# Patient Record
Sex: MALE | Race: WHITE | Employment: FULL TIME | URBAN - METROPOLITAN AREA
[De-identification: names, ages, dates, MRNs, and addresses within clinical notes are randomized per-mention and may not be internally consistent; named-entity substitution may affect disease eponyms.]

---

## 2017-05-10 ENCOUNTER — TRANSFERRED RECORDS (OUTPATIENT)
Dept: HEALTH INFORMATION MANAGEMENT | Facility: CLINIC | Age: 57
End: 2017-05-10

## 2017-09-06 ENCOUNTER — OFFICE VISIT (OUTPATIENT)
Dept: PEDIATRICS | Facility: CLINIC | Age: 57
End: 2017-09-06
Payer: COMMERCIAL

## 2017-09-06 VITALS
OXYGEN SATURATION: 96 % | HEART RATE: 90 BPM | DIASTOLIC BLOOD PRESSURE: 85 MMHG | TEMPERATURE: 99.1 F | WEIGHT: 192.1 LBS | BODY MASS INDEX: 34.03 KG/M2 | SYSTOLIC BLOOD PRESSURE: 147 MMHG

## 2017-09-06 DIAGNOSIS — I10 ESSENTIAL HYPERTENSION, BENIGN: ICD-10-CM

## 2017-09-06 DIAGNOSIS — J06.9 VIRAL UPPER RESPIRATORY TRACT INFECTION: Primary | ICD-10-CM

## 2017-09-06 DIAGNOSIS — R06.2 WHEEZING: ICD-10-CM

## 2017-09-06 PROCEDURE — 99213 OFFICE O/P EST LOW 20 MIN: CPT | Mod: GE | Performed by: INTERNAL MEDICINE

## 2017-09-06 RX ORDER — ALBUTEROL SULFATE 90 UG/1
2 AEROSOL, METERED RESPIRATORY (INHALATION) EVERY 4 HOURS PRN
Qty: 1 INHALER | Refills: 1 | Status: SHIPPED | OUTPATIENT
Start: 2017-09-06 | End: 2019-12-13

## 2017-09-06 RX ORDER — FLUTICASONE PROPIONATE 50 MCG
1-2 SPRAY, SUSPENSION (ML) NASAL DAILY
Qty: 3 BOTTLE | Refills: 1 | Status: SHIPPED | OUTPATIENT
Start: 2017-09-06 | End: 2019-12-13

## 2017-09-06 NOTE — NURSING NOTE
"Chief Complaint   Patient presents with     Cough       Initial Wt 192 lb 1.6 oz (87.1 kg)  BMI 34.03 kg/m2 Estimated body mass index is 34.03 kg/(m^2) as calculated from the following:    Height as of 4/22/14: 5' 3\" (1.6 m).    Weight as of this encounter: 192 lb 1.6 oz (87.1 kg).  Medication Reconciliation: complete   SMA Shraddha  "

## 2017-09-06 NOTE — PROGRESS NOTES
"  SUBJECTIVE:   Jeffrey Skaggs is a 57 year old male who presents to clinic today for the following health issues:      RESPIRATORY SYMPTOMS      Duration: 10 days    Description  cough and wheezing    Severity: mild    Accompanying signs and symptoms: Hacking cough with mucus, \"phlegmy cough\"    History (predisposing factors):  none    Precipitating or alleviating factors: None    Therapies tried and outcome:  OTC cough medicine    URI: Patient with 10 days of symptoms starting with rhinorrhea, congestion, & drainage. This quickly resolved, but clear productive cough has lingered. Now with wheezing & continued nagging cough. No fevers, chills. No SOB or chest pain.    HTN: Hx of Benign essential HTN. No longer on medications. 160s systolic on presentation today, 140s on recheck. No HAs or flushing.     Problem list and histories reviewed & adjusted, as indicated.  Additional history: as documented    Patient Active Problem List   Diagnosis     Essential hypertension, benign     Abnormal liver function tests     Type 2 diabetes mellitus with stage 2 chronic kidney disease (H)     CKD (chronic kidney disease) stage 3, GFR 30-59 ml/min     Cirrhosis of liver (H)     Hyperlipidemia LDL goal <100     History reviewed. No pertinent surgical history.    Social History   Substance Use Topics     Smoking status: Never Smoker     Smokeless tobacco: Never Used     Alcohol use Yes      Comment: stopped2-3 weeks ago     Family History   Problem Relation Age of Onset     C.A.D. Father      first MI at age 73     DIABETES Mother      C.A.D. Paternal Grandfather      first MI in his 30s, from which he          Reviewed and updated as needed this visit by clinical staff     Reviewed and updated as needed this visit by Provider       ROS:  Constitutional, HEENT, cardiovascular, pulmonary, gi and gu systems are negative, except as otherwise noted.    OBJECTIVE:   /85  Pulse 90  Temp 99.1  F (37.3  C) (Oral)  Wt 192 lb 1.6 " oz (87.1 kg)  SpO2 96%  BMI 34.03 kg/m2  Body mass index is 34.03 kg/(m^2).  GENERAL: healthy, alert and no distress  EYES: Eyes grossly normal to inspection, PERRL and conjunctivae and sclerae normal  HENT: ear canals with cerumen bilaterally, nose and mouth without ulcers or lesions  NECK: no adenopathy, no asymmetry  RESP: Wheezing at bilateral bases. Good air movement, good oxygenation  CV: regular rate and rhythm, normal S1 S2, no murmurs and peripheral pulses strong  SKIN: no suspicious lesions or rashes  NEURO: Normal strength and tone, mentation intact and speech normal    Diagnostic Test Results:  none     ASSESSMENT/PLAN:   Jeffrey Skaggs is a 57 year old with complex medical history: HTN, DMII, CKD, Liver Cirrhosis, HLD, who presents with URI symptoms & reactive cough/wheezing. Patient HTNsive on arrival to 160s with improvement to 140s later in the visit. Will get nursing recheck in 2 weeks & establish care with this clinic.     1. Viral upper respiratory tract infection  - Supportive cares: Hydration, rest  - Can continue OTC cold remedies  - fluticasone (FLONASE) 50 MCG/ACT spray; Spray 1-2 sprays into both nostrils daily  Dispense: 3 Bottle; Refill: 1  - Albuterol as per below    2. Wheezing  - albuterol (PROAIR HFA/PROVENTIL HFA/VENTOLIN HFA) 108 (90 BASE) MCG/ACT Inhaler; Inhale 2 puffs into the lungs every 4 hours as needed for shortness of breath / dyspnea or wheezing  Dispense: 1 Inhaler; Refill: 1    3. Essential hypertension, benign: Patient with hx of BEH. 160s ->140s today.   - 2 week BP recheck    Follow up in 2 weeks of HTN. Follow up within 1 month to establish complete care at [facility].     Adal Palma MD  [facility]    I have discussed the patient with the resident and agree with the jointly developed plan as documented above    Marleny Abdalla MD  Internal Medicine - Pediatrics

## 2017-09-06 NOTE — PATIENT INSTRUCTIONS
Cough/Cold  - Consider Netti pot of congestion returns  - Flonase, two puffs each nostril twice daily for 2 weeks, daily after that as needed  - Albuterol 2 puffs every 4 hours for the next 4-5 days, then as needed    High Blood Pressure  - Follow up in 2 weeks for nursing only recheck    Establish Care  - Please schedule a physical in the next month to discuss your medical issues

## 2017-09-06 NOTE — MR AVS SNAPSHOT
After Visit Summary   9/6/2017    Jeffrey Skaggs    MRN: [id number]           Patient Information     Date Of Birth          1960        Visit Information        Provider Department      9/6/2017 1:00 PM Adal Palma MD [facility]        Today's Diagnoses     Viral upper respiratory tract infection    -  1    Wheezing        Essential hypertension, benign          Care Instructions    Cough/Cold  - Consider Netti pot of congestion returns  - Flonase, two puffs each nostril twice daily for 2 weeks, daily after that as needed  - Albuterol 2 puffs every 4 hours for the next 4-5 days, then as needed    High Blood Pressure  - Follow up in 2 weeks for nursing only recheck    Establish Care  - Please schedule a physical in the next month to discuss your medical issues          Follow-ups after your visit        Who to contact     If you have questions or need follow up information about today's clinic visit or your schedule please contact [facility] directly at [phone].  Normal or non-critical lab and imaging results will be communicated to you by [email or web address], letter or phone within 4 business days after the clinic has received the results. If you do not hear from us within 7 days, please contact the clinic through [employer] or phone. If you have a critical or abnormal lab result, we will notify you by phone as soon as possible.  Submit refill requests through [employer] or call your pharmacy and they will forward the refill request to us. Please allow 3 business days for your refill to be completed.          Additional Information About Your Visit        [email or web address] Information     [employer] gives you secure access to your electronic health record. If you see a primary care provider, you can also send messages to your care team and make appointments. If you have questions, please call your primary care clinic.  If you do not have a primary care provider, please call [phone] and  they will assist you.        Care EveryWhere ID     This is your Care EveryWhere ID. This could be used by other organizations to access your [facility] medical records  [id number]        Your Vitals Were     Pulse Temperature Pulse Oximetry BMI (Body Mass Index)          90 99.1  F (37.3  C) (Oral) 96% 34.03 kg/m2         Blood Pressure from Last 3 Encounters:   09/06/17 147/85   04/22/14 110/68   05/22/13 127/69    Weight from Last 3 Encounters:   09/06/17 192 lb 1.6 oz (87.1 kg)   04/22/14 160 lb (72.6 kg)   05/10/13 160 lb (72.6 kg)              Today, you had the following     No orders found for display         Today's Medication Changes          These changes are accurate as of: 9/6/17  1:36 PM.  If you have any questions, ask your nurse or doctor.               Start taking these medicines.        Dose/Directions    albuterol 108 (90 BASE) MCG/ACT Inhaler   Commonly known as:  PROAIR HFA/PROVENTIL HFA/VENTOLIN HFA   Used for:  Wheezing   Started by:  Adal Palma MD        Dose:  2 puff   Inhale 2 puffs into the lungs every 4 hours as needed for shortness of breath / dyspnea or wheezing   Quantity:  1 Inhaler   Refills:  1       fluticasone 50 MCG/ACT spray   Commonly known as:  FLONASE   Used for:  Viral upper respiratory tract infection   Started by:  Adal Palma MD        Dose:  1-2 spray   Spray 1-2 sprays into both nostrils daily   Quantity:  3 Bottle   Refills:  1            Where to get your medicines      These medications were sent to [facility] [address] [place] [address] - [address] AT [place] [address] & [employer]  [address], [facility] [address]     Phone:  [phone]     albuterol 108 (90 BASE) MCG/ACT Inhaler    fluticasone 50 MCG/ACT spray                Primary Care Provider Office Phone # Fax #    Young Owens [phone]       [address] [facility] [address]        Equal Access to Services     RICHIE COLLAZO AH: Kim merchant  andrea Castellanos, wabarbarada luqadaha, qaybta kaalmada beatriz, kaitlin perkins. So [facility] [phone].    ATENCIÓN: Si malenala gino, tiene a whitten disposición servicios gratuitos de asistencia lingüística. Felecia al [phone].    We comply with applicable federal civil rights laws and [place] laws. We do not discriminate on the basis of race, color, national origin, age, disability sex, sexual orientation or gender identity.            Thank you!     Thank you for choosing [facility]  for your care. Our goal is always to provide you with excellent care. Hearing back from our patients is one way we can continue to improve our services. Please take a few minutes to complete the written survey that you may receive in the mail after your visit with us. Thank you!             Your Updated Medication List - Protect others around you: Learn how to safely use, store and throw away your medicines at www.disposemymeds.org.          This list is accurate as of: 9/6/17  1:36 PM.  Always use your most recent med list.                   Brand Name Dispense Instructions for use Diagnosis    * ACE/ARB NOT PRESCRIBED (INTENTIONAL)      1 each continuous prn ACE & ARB not prescribed due to Worsening renal function on ACE/ARB therapy    Type 2 diabetes, HbA1C goal < 8% (H), CKD (chronic kidney disease) stage 3, GFR 30-59 ml/min       albuterol 108 (90 BASE) MCG/ACT Inhaler    PROAIR HFA/PROVENTIL HFA/VENTOLIN HFA    1 Inhaler    Inhale 2 puffs into the lungs every 4 hours as needed for shortness of breath / dyspnea or wheezing    Wheezing       ALDACTONE 25 MG tablet   Generic drug:  spironolactone      Take 1 tablet by mouth daily.        aspirin 81 MG tablet     30 tablet    Take 1 tablet (81 mg) by mouth daily    Type 2 diabetes, HbA1C goal < 8% (H)       blood glucose monitoring lancets     100 each    1 Device 2 times daily    Type 2 diabetes, HbA1C goal < 8% (H)       blood glucose  monitoring test strip    ACCU-CHEK SMARTVIEW    1 Box    Test 2 times a day    Type 2 diabetes, HbA1C goal < 8% (H)       calcium-vitamin D 600-400 MG-UNIT per tablet    CALTRATE     Take 1 tablet by mouth 2 times daily.        fluticasone 50 MCG/ACT spray    FLONASE    3 Bottle    Spray 1-2 sprays into both nostrils daily    Viral upper respiratory tract infection       furosemide 20 MG tablet    LASIX     Take 20 mg by mouth daily.        * STATIN NOT PRESCRIBED (INTENTIONAL)     0 each    1 each continuous prn Statin not prescribed intentionally due to Clinically Significant elevated serum transaminases (AST/ALT) or creatine kinase    Type 2 diabetes, HbA1C goal < 8% (H), CARDIOVASCULAR SCREENING; LDL GOAL LESS THAN 100       * Notice:  This list has 2 medication(s) that are the same as other medications prescribed for you. Read the directions carefully, and ask your doctor or other care provider to review them with you.

## 2018-05-24 ENCOUNTER — TELEPHONE (OUTPATIENT)
Dept: PEDIATRICS | Facility: CLINIC | Age: 58
End: 2018-05-24

## 2018-05-24 ENCOUNTER — OFFICE VISIT (OUTPATIENT)
Dept: PEDIATRICS | Facility: CLINIC | Age: 58
End: 2018-05-24
Payer: COMMERCIAL

## 2018-05-24 VITALS
SYSTOLIC BLOOD PRESSURE: 142 MMHG | OXYGEN SATURATION: 95 % | HEART RATE: 89 BPM | TEMPERATURE: 97.5 F | WEIGHT: 189 LBS | BODY MASS INDEX: 33.48 KG/M2 | DIASTOLIC BLOOD PRESSURE: 76 MMHG

## 2018-05-24 DIAGNOSIS — R10.31 ABDOMINAL PAIN, BILATERAL LOWER QUADRANT: Primary | ICD-10-CM

## 2018-05-24 DIAGNOSIS — R31.9 URINARY TRACT INFECTION WITH HEMATURIA, SITE UNSPECIFIED: ICD-10-CM

## 2018-05-24 DIAGNOSIS — R10.32 ABDOMINAL PAIN, BILATERAL LOWER QUADRANT: Primary | ICD-10-CM

## 2018-05-24 DIAGNOSIS — N39.0 URINARY TRACT INFECTION WITH HEMATURIA, SITE UNSPECIFIED: ICD-10-CM

## 2018-05-24 LAB
ALBUMIN UR-MCNC: >=300 MG/DL
ANION GAP SERPL CALCULATED.3IONS-SCNC: 10 MMOL/L (ref 3–14)
APPEARANCE UR: ABNORMAL
BACTERIA #/AREA URNS HPF: ABNORMAL /HPF
BILIRUB UR QL STRIP: ABNORMAL
BUN SERPL-MCNC: 12 MG/DL (ref 7–30)
CALCIUM SERPL-MCNC: 8.7 MG/DL (ref 8.5–10.1)
CHLORIDE SERPL-SCNC: 97 MMOL/L (ref 94–109)
CO2 SERPL-SCNC: 26 MMOL/L (ref 20–32)
COLOR UR AUTO: ABNORMAL
CREAT SERPL-MCNC: 1.4 MG/DL (ref 0.66–1.25)
GFR SERPL CREATININE-BSD FRML MDRD: 52 ML/MIN/1.7M2
GLUCOSE SERPL-MCNC: 162 MG/DL (ref 70–99)
GLUCOSE UR STRIP-MCNC: 100 MG/DL
HGB UR QL STRIP: ABNORMAL
KETONES UR STRIP-MCNC: ABNORMAL MG/DL
LEUKOCYTE ESTERASE UR QL STRIP: ABNORMAL
NITRATE UR QL: NEGATIVE
NON-SQ EPI CELLS #/AREA URNS LPF: ABNORMAL /LPF
PH UR STRIP: 5.5 PH (ref 5–7)
POTASSIUM SERPL-SCNC: 4.1 MMOL/L (ref 3.4–5.3)
RBC #/AREA URNS AUTO: ABNORMAL /HPF
SODIUM SERPL-SCNC: 133 MMOL/L (ref 133–144)
SOURCE: ABNORMAL
SP GR UR STRIP: 1.02 (ref 1–1.03)
URATE CRY #/AREA URNS HPF: ABNORMAL /HPF
UROBILINOGEN UR STRIP-ACNC: 0.2 EU/DL (ref 0.2–1)
WBC # BLD AUTO: 9.9 10E9/L (ref 4–11)
WBC #/AREA URNS AUTO: ABNORMAL /HPF

## 2018-05-24 PROCEDURE — 85048 AUTOMATED LEUKOCYTE COUNT: CPT | Performed by: PHYSICIAN ASSISTANT

## 2018-05-24 PROCEDURE — 36415 COLL VENOUS BLD VENIPUNCTURE: CPT | Performed by: PHYSICIAN ASSISTANT

## 2018-05-24 PROCEDURE — 80048 BASIC METABOLIC PNL TOTAL CA: CPT | Performed by: PHYSICIAN ASSISTANT

## 2018-05-24 PROCEDURE — 81001 URINALYSIS AUTO W/SCOPE: CPT | Performed by: PHYSICIAN ASSISTANT

## 2018-05-24 PROCEDURE — 99213 OFFICE O/P EST LOW 20 MIN: CPT | Performed by: PHYSICIAN ASSISTANT

## 2018-05-24 RX ORDER — CIPROFLOXACIN 500 MG/1
500 TABLET, FILM COATED ORAL 2 TIMES DAILY
Qty: 20 TABLET | Refills: 0 | Status: SHIPPED | OUTPATIENT
Start: 2018-05-24 | End: 2019-12-13

## 2018-05-24 RX ORDER — AMOXICILLIN 500 MG/1
CAPSULE ORAL
COMMUNITY
Start: 2018-05-09 | End: 2019-12-13

## 2018-05-24 RX ORDER — OMEGA-3 FATTY ACIDS/FISH OIL 300-1000MG
CAPSULE ORAL
COMMUNITY
End: 2019-12-13

## 2018-05-24 NOTE — PATIENT INSTRUCTIONS
"Follow up if worsening  Follow up if not improved in 2-3 days         BLADDER INFECTION: Male (Adult)    A bladder infection (\"cystitis\" or \"UTI\") usually causes a constant urge to urinate, and a burning when passing urine. Urine may be cloudy, smelly or dark. There may be also be pain in the lower abdomen.  Cystitis in males is not common. It may be caused by a partial blockage in the urinary system that keeps the bladder from emptying completely. This is most often related to an enlarged prostate gland.  HOME CARE:  1. Drink lots of fluids (at least 6-8 glasses a day). This will flush the bacteria out of your bladder. Cranberry juice has been shown to help clear out the bacteria.  2. Avoid sexual intercourse until your symptoms are gone.  3. A bladder infection is treated with antibiotics. You may also be given Pyridium (generic - phenazopyridine) to reduce burning with urination. This will cause urine to become a bright orange color, which can stain clothing.  FOLLOW UP with your doctor or this facility if ALL symptoms have not cleared within five days. It is important to keep your follow up appointment to discuss with your doctor the need for further tests of the urinary tract.  GET PROMPT MEDICAL ATTENTION if any of the following occur:    Fever over 101  F (38.3  C)    No improvement by the third day of treatment    Increasing back or abdominal pain    Repeated vomiting; unable to keep medicine down    Weakness, dizziness or fainting    8728-1712 [employer]. [address] [place] [address], [place] [address]. All rights reserved. This information is not intended as a substitute for professional medical care. Always follow your healthcare professional's instructions.  This information has been modified by your health care provider with permission from the publisher.    Pyelonephritis or Kidney Infection (Adult Male)  An infection of one or both kidneys is called pyelonephritis. It usually happens when " bacteria (or rarely, viruses, fungi, or other disease-causing organisms) get into the kidneys. The bacteria (or other disease-causing organisms) can enter the kidneys from the bladder or blood traveling from other parts of the body to cause pyelonephritis. A kidney infection can become serious. It can cause severe illness, scarring of the kidneys, or kidney failure if not treated properly.   Common causes include:    Not keeping the genital area clean and dry, which promotes the growth of bacteria    Wearing tight pants or underwear, which allows moisture to build up in the genital area, helping bacteria grow    Holding urine for long periods of time    Dehydration  Kidney infections can cause symptoms similar to bladder infections. The infection can cause one or more of these symptoms:    Pain or burning when urinating    Having to urinate more often than usual    Blood in urine (pink or red)    Belly pain or discomfort, usually in the lower abdomen    Pain in the side or back    Pain above the pubic bone    Fever or chills    Vomiting    Loss of appetite  Treatment is oral antibiotics, or in more severe cases, intramuscular or intravenous (IV) antibiotics. These are started right away. Treatment helps prevent a more serious kidney infection.  Medicines  Medicines can help in the treatment of kidney infection:    Take antibiotics until they are used up, even if you feel better. It is important to finish them to make sure the infection is gone.    Unless another medicine was given, you can use over-the-counter medicines for pain, fever, or discomfort. If you have chronic liver or kidney disease, talk with your healthcare provider before taking these medicines. Also tell your provider if you've ever had a stomach ulcer of gastrointestinal (GI) bleeding, or are taking blood thinners.  Home care  The following guidelines can help you care for yourself at home:    Stay home from work or school. Rest in bed until your  fever breaks and you are feeling better, or as advised by your healthcare provider.    Drink lots of fluid unless you must restrict fluids for other medical reasons. This will force the medicine into your urinary system and help flush the bacteria out of your body. Ask your healthcare provider how much you should drink.    Avoid sex until you have finished all of your medicine and your symptoms are gone.    Avoid caffeine, alcohol, and spicy foods. These foods may irritate the kidneys and bladder.    Wear loose-fitting clothes and cotton underwear.  Prevention  These self-care steps can help prevent future infections:    Drink plenty of fluids to prevent dehydration and flush out the bladder. Do this unless you must restrict your fluids for other health reasons, or your healthcare provider told you not to.    Proper cleaning after going to the bathroom is important.    Clean your penis regularly. If you aren't circumcised, pull back the foreskin when cleaning.    Urinate more often. Don't try to hold urine in for a long time.    Avoid tight-fitting pants and underwear.    Improve your diet and prevent constipation. Eat more fresh fruit, vegetables, and fiber. Eat less junk and fatty foods. Constipation can increase your chance of getting a urinary tract infection. Talk with your healthcare provider if you have trouble with bowel movements.    Urinate right after sex to flush out the bladder.  Follow-up care  Follow up with your healthcare provider, or as advised. Additional testing may be needed to make sure the infection is clearing. Close follow-up and further testing is very important to find the cause and to prevent future infections.  If a urine culture was done, you will be contacted if your treatment needs to be changed. If directed, you can call to find out the results.  If you had an X-ray, CT scan, or another diagnostic test, you will be notified of any new findings that may affect your care.  Call  911  Call 911 if any of the following occur:    Trouble breathing    Fainting or loss of consciousness    Rapid or very slow heart rate    Weakness, dizziness, or fainting    Difficulty arousing or confusion  When to seek medical advice  Call your healthcare provider right away if any of the following occur:    Fever of 100.4  F (38  C) or higher, or as directed by your healthcare provider    Not feeling better within 1 to 2 days after starting antibiotics    Any symptom that continues after 3 days of treatment    Increasing pain in the stomach, back, side, or groin area    Repeated vomiting    Not able to take prescribed medicine due to nausea or another reason    Bloody, dark-colored, or foul smelling urine    Trouble urinating or decreased urine output    No urine for 8 hours, no tears when crying, sunken eyes, or dry mouth  Date Last Reviewed: 10/1/2016    4444-9173 [employer]. [address] [place] [address]. All rights reserved. This information is not intended as a substitute for professional medical care. Always follow your healthcare professional's instructions.

## 2018-05-24 NOTE — TELEPHONE ENCOUNTER
Patient suspecting food poisoning.    Sx presented quickly. Started Tuesday late evening and still present this morning. Reports generalized abdominal pain just below the waits and under rib cage. Dull pain traveled to groin. Dull ache is constant. Is able to stand up straight and find a comfortable positioning. Denies fever, diarrhea, or vomiting. 1 episode of dry heeves and mild nausea.     Has tried bland diet, chicken soup, water, and ginger ale.     No other persons in home have sx. He reports preparing raw hamburger meat while on grill and not sure of possible cross contamination.     Reports hx of similar episode a while back. No hx of diverticulitis or kidney stones. Hx of cirrhosis and ascites but not active.     OV scheduled per patient request.     Bethany DOMINGO RN, BSN, PHN  Iron Gunter RN

## 2018-05-24 NOTE — PROGRESS NOTES
SUBJECTIVE:   Jeffrey Skaggs is a 57 year old male who presents to clinic today for the following health issues:      ABDOMINAL PAIN     Onset: 2 days ago- Tuesday evening     Description:   Character: Dull ache and constant  Location: under the rib cage to the waist- bot sides   Radiation: groin    Intensity: 9/10 Tuesday, now 2-3/10    Progression of Symptoms:  improving    Accompanying Signs & Symptoms:  Fever/Chills?: no   Gas/Bloating: no   Nausea: YES- mild  Vomitting: no-dry heaves  Diarrhea?: no   Constipation:no.  Passed stool 1 hour ago  Dysuria or Hematuria: no    History:   Trauma: no   Previous similar pain: YES- I previous episode a while back, hx  Of cirrhosis and ascites but not active    Previous tests done: none    Precipitating factors:   Does the pain change with:     Food: no      BM: no     Urination: no     Alleviating factors:  Standing and shower    Therapies Tried and outcome: [place] diet, chicken soup, water, and ginger ale, Miralax     LMP:  not applicable     No muscle, bladder changes   Pre-diabetic, no therapy    Amoxicillin 500 mg BID for 1 week    Primary: Yasmeen    ROS:  10 point ROS negative except as listed above      OBJECTIVE:     /76  Pulse 89  Temp 97.5  F (36.4  C) (Oral)  Wt 189 lb (85.7 kg)  SpO2 95%  BMI 33.48 kg/m2  Body mass index is 33.48 kg/(m^2).  GENERAL: healthy, alert and no distress  NECK: no adenopathy, no asymmetry, masses, or scars and thyroid normal to palpation  RESP: lungs clear to auscultation - no rales, rhonchi or wheezes  CV: regular rate and rhythm, normal S1 S2, no S3 or S4, no murmur, click or rub, no peripheral edema and peripheral pulses strong  ABDOMEN: soft, nontender, no hepatosplenomegaly, no masses and bowel sounds normal  MS: no gross musculoskeletal defects noted, no edema  SKIN: no suspicious lesions or rashes  NEURO: Normal strength and tone, mentation intact and speech normal  BACK: no CVA tenderness, no paralumbar  tenderness  PSYCH: mentation appears normal, affect normal/bright    Diagnostic Test Results:  Results for orders placed or performed in visit on 05/24/18   UA reflex to Microscopic   Result Value Ref Range    Color Urine Elizabeth     Appearance Urine Slightly Cloudy     Glucose Urine 100 (A) NEG^Negative mg/dL    Bilirubin Urine Small (A) NEG^Negative    Ketones Urine Trace (A) NEG^Negative mg/dL    Specific Gravity Urine 1.020 1.003 - 1.035    Blood Urine Large (A) NEG^Negative    pH Urine 5.5 5.0 - 7.0 pH    Protein Albumin Urine >=300 (A) NEG^Negative mg/dL    Urobilinogen Urine 0.2 0.2 - 1.0 EU/dL    Nitrite Urine Negative NEG^Negative    Leukocyte Esterase Urine Trace (A) NEG^Negative    Source Midstream Urine    WBC count   Result Value Ref Range    WBC 9.9 4.0 - 11.0 10e9/L   Basic metabolic panel  (Ca, Cl, CO2, Creat, Gluc, K, Na, BUN)   Result Value Ref Range    Sodium 133 133 - 144 mmol/L    Potassium 4.1 3.4 - 5.3 mmol/L    Chloride 97 94 - 109 mmol/L    Carbon Dioxide 26 20 - 32 mmol/L    Anion Gap 10 3 - 14 mmol/L    Glucose 162 (H) 70 - 99 mg/dL    Urea Nitrogen 12 7 - 30 mg/dL    Creatinine 1.40 (H) 0.66 - 1.25 mg/dL    GFR Estimate 52 (L) >60 mL/min/1.7m2    GFR Estimate If Black 63 >60 mL/min/1.7m2    Calcium 8.7 8.5 - 10.1 mg/dL   Urine Microscopic   Result Value Ref Range    WBC Urine 5-10 (A) OTO5^0 - 5 /HPF    RBC Urine  (A) OTO2^O - 2 /HPF    Squamous Epithelial /LPF Urine Moderate (A) FEW^Few /LPF    Bacteria Urine Moderate (A) NEG^Negative /HPF    Uric Acid Crystals Many (A) NEG^Negative /HPF       ASSESSMENT/PLAN:     (R10.31,  R10.32) Abdominal pain, bilateral lower quadrant  (primary encounter diagnosis)  Plan: UA reflex to Microscopic, WBC count, Basic         metabolic panel  (Ca, Cl, CO2, Creat, Gluc, K,         Na, BUN), Urine Microscopic  Red flags and emergent follow up discussed, and understood by patient  Follow up with PCP if symptoms worsen or fail to improve      (N39.0,   "R31.9) Urinary tract infection with hematuria, site unspecified  Plan: ciprofloxacin (CIPRO) 500 MG tablet  Follow up with PCP if symptoms worsen or fail to improve  In 2-3 days      Patient Instructions   Follow up if worsening  Follow up if not improved in 2-3 days         BLADDER INFECTION: Male (Adult)    A bladder infection (\"cystitis\" or \"UTI\") usually causes a constant urge to urinate, and a burning when passing urine. Urine may be cloudy, smelly or dark. There may be also be pain in the lower abdomen.  Cystitis in males is not common. It may be caused by a partial blockage in the urinary system that keeps the bladder from emptying completely. This is most often related to an enlarged prostate gland.  HOME CARE:  1. Drink lots of fluids (at least 6-8 glasses a day). This will flush the bacteria out of your bladder. Cranberry juice has been shown to help clear out the bacteria.  2. Avoid sexual intercourse until your symptoms are gone.  3. A bladder infection is treated with antibiotics. You may also be given Pyridium (generic - phenazopyridine) to reduce burning with urination. This will cause urine to become a bright orange color, which can stain clothing.  FOLLOW UP with your doctor or this facility if ALL symptoms have not cleared within five days. It is important to keep your follow up appointment to discuss with your doctor the need for further tests of the urinary tract.  GET PROMPT MEDICAL ATTENTION if any of the following occur:    Fever over 101  F (38.3  C)    No improvement by the third day of treatment    Increasing back or abdominal pain    Repeated vomiting; unable to keep medicine down    Weakness, dizziness or fainting    [id number] [employer]. [address] [place] [address], [place] [address]. All rights reserved. This information is not intended as a substitute for professional medical care. Always follow your healthcare professional's instructions.  This information has been modified " by your health care provider with permission from the publisher.    Pyelonephritis or Kidney Infection (Adult Male)  An infection of one or both kidneys is called pyelonephritis. It usually happens when bacteria (or rarely, viruses, fungi, or other disease-causing organisms) get into the kidneys. The bacteria (or other disease-causing organisms) can enter the kidneys from the bladder or blood traveling from other parts of the body to cause pyelonephritis. A kidney infection can become serious. It can cause severe illness, scarring of the kidneys, or kidney failure if not treated properly.   Common causes include:    Not keeping the genital area clean and dry, which promotes the growth of bacteria    Wearing tight pants or underwear, which allows moisture to build up in the genital area, helping bacteria grow    Holding urine for long periods of time    Dehydration  Kidney infections can cause symptoms similar to bladder infections. The infection can cause one or more of these symptoms:    Pain or burning when urinating    Having to urinate more often than usual    Blood in urine (pink or red)    Belly pain or discomfort, usually in the lower abdomen    Pain in the side or back    Pain above the pubic bone    Fever or chills    Vomiting    Loss of appetite  Treatment is oral antibiotics, or in more severe cases, intramuscular or intravenous (IV) antibiotics. These are started right away. Treatment helps prevent a more serious kidney infection.  Medicines  Medicines can help in the treatment of kidney infection:    Take antibiotics until they are used up, even if you feel better. It is important to finish them to make sure the infection is gone.    Unless another medicine was given, you can use over-the-counter medicines for pain, fever, or discomfort. If you have chronic liver or kidney disease, talk with your healthcare provider before taking these medicines. Also tell your provider if you've ever had a stomach  ulcer of gastrointestinal (GI) bleeding, or are taking blood thinners.  Home care  The following guidelines can help you care for yourself at home:    Stay home from work or school. Rest in bed until your fever breaks and you are feeling better, or as advised by your healthcare provider.    Drink lots of fluid unless you must restrict fluids for other medical reasons. This will force the medicine into your urinary system and help flush the bacteria out of your body. Ask your healthcare provider how much you should drink.    Avoid sex until you have finished all of your medicine and your symptoms are gone.    Avoid caffeine, alcohol, and spicy foods. These foods may irritate the kidneys and bladder.    Wear loose-fitting clothes and cotton underwear.  Prevention  These self-care steps can help prevent future infections:    Drink plenty of fluids to prevent dehydration and flush out the bladder. Do this unless you must restrict your fluids for other health reasons, or your healthcare provider told you not to.    Proper cleaning after going to the bathroom is important.    Clean your penis regularly. If you aren't circumcised, pull back the foreskin when cleaning.    Urinate more often. Don't try to hold urine in for a long time.    Avoid tight-fitting pants and underwear.    Improve your diet and prevent constipation. Eat more fresh fruit, vegetables, and fiber. Eat less junk and fatty foods. Constipation can increase your chance of getting a urinary tract infection. Talk with your healthcare provider if you have trouble with bowel movements.    Urinate right after sex to flush out the bladder.  Follow-up care  Follow up with your healthcare provider, or as advised. Additional testing may be needed to make sure the infection is clearing. Close follow-up and further testing is very important to find the cause and to prevent future infections.  If a urine culture was done, you will be contacted if your treatment needs  to be changed. If directed, you can call to find out the results.  If you had an X-ray, CT scan, or another diagnostic test, you will be notified of any new findings that may affect your care.  Call 911  Call 911 if any of the following occur:    Trouble breathing    Fainting or loss of consciousness    Rapid or very slow heart rate    Weakness, dizziness, or fainting    Difficulty arousing or confusion  When to seek medical advice  Call your healthcare provider right away if any of the following occur:    Fever of 100.4  F (38  C) or higher, or as directed by your healthcare provider    Not feeling better within 1 to 2 days after starting antibiotics    Any symptom that continues after 3 days of treatment    Increasing pain in the stomach, back, side, or groin area    Repeated vomiting    Not able to take prescribed medicine due to nausea or another reason    Bloody, dark-colored, or foul smelling urine    Trouble urinating or decreased urine output    No urine for 8 hours, no tears when crying, sunken eyes, or dry mouth  Date Last Reviewed: 10/1/2016    [id number] [employer]. [address] [place] [address], [place] [address]. All rights reserved. This information is not intended as a substitute for professional medical care. Always follow your healthcare professional's instructions.              Diego Alexander PA-C  [email or web address]

## 2018-05-24 NOTE — MR AVS SNAPSHOT
"              After Visit Summary   5/24/2018    Jeffrey Skaggs    MRN: [id number]           Patient Information     Date Of Birth          1960        Visit Information        Provider Department      5/24/2018 1:40 PM Diego Alexander PA-C [facility]        Today's Diagnoses     Abdominal pain, bilateral lower quadrant    -  1    Urinary tract infection with hematuria, site unspecified          Care Instructions    Follow up if worsening  Follow up if not improved in 2-3 days         BLADDER INFECTION: Male (Adult)    A bladder infection (\"cystitis\" or \"UTI\") usually causes a constant urge to urinate, and a burning when passing urine. Urine may be cloudy, smelly or dark. There may be also be pain in the lower abdomen.  Cystitis in males is not common. It may be caused by a partial blockage in the urinary system that keeps the bladder from emptying completely. This is most often related to an enlarged prostate gland.  HOME CARE:  1. Drink lots of fluids (at least 6-8 glasses a day). This will flush the bacteria out of your bladder. Cranberry juice has been shown to help clear out the bacteria.  2. Avoid sexual intercourse until your symptoms are gone.  3. A bladder infection is treated with antibiotics. You may also be given Pyridium (generic - phenazopyridine) to reduce burning with urination. This will cause urine to become a bright orange color, which can stain clothing.  FOLLOW UP with your doctor or this facility if ALL symptoms have not cleared within five days. It is important to keep your follow up appointment to discuss with your doctor the need for further tests of the urinary tract.  GET PROMPT MEDICAL ATTENTION if any of the following occur:    Fever over 101  F (38.3  C)    No improvement by the third day of treatment    Increasing back or abdominal pain    Repeated vomiting; unable to keep medicine down    Weakness, dizziness or fainting    0206-4772 [employer], " [employer]. [address] [place] [address]. All rights reserved. This information is not intended as a substitute for professional medical care. Always follow your healthcare professional's instructions.  This information has been modified by your health care provider with permission from the publisher.    Pyelonephritis or Kidney Infection (Adult Male)  An infection of one or both kidneys is called pyelonephritis. It usually happens when bacteria (or rarely, viruses, fungi, or other disease-causing organisms) get into the kidneys. The bacteria (or other disease-causing organisms) can enter the kidneys from the bladder or blood traveling from other parts of the body to cause pyelonephritis. A kidney infection can become serious. It can cause severe illness, scarring of the kidneys, or kidney failure if not treated properly.   Common causes include:    Not keeping the genital area clean and dry, which promotes the growth of bacteria    Wearing tight pants or underwear, which allows moisture to build up in the genital area, helping bacteria grow    Holding urine for long periods of time    Dehydration  Kidney infections can cause symptoms similar to bladder infections. The infection can cause one or more of these symptoms:    Pain or burning when urinating    Having to urinate more often than usual    Blood in urine (pink or red)    Belly pain or discomfort, usually in the lower abdomen    Pain in the side or back    Pain above the pubic bone    Fever or chills    Vomiting    Loss of appetite  Treatment is oral antibiotics, or in more severe cases, intramuscular or intravenous (IV) antibiotics. These are started right away. Treatment helps prevent a more serious kidney infection.  Medicines  Medicines can help in the treatment of kidney infection:    Take antibiotics until they are used up, even if you feel better. It is important to finish them to make sure the infection is gone.    Unless another medicine was  given, you can use over-the-counter medicines for pain, fever, or discomfort. If you have chronic liver or kidney disease, talk with your healthcare provider before taking these medicines. Also tell your provider if you've ever had a stomach ulcer of gastrointestinal (GI) bleeding, or are taking blood thinners.  Home care  The following guidelines can help you care for yourself at home:    Stay home from work or school. Rest in bed until your fever breaks and you are feeling better, or as advised by your healthcare provider.    Drink lots of fluid unless you must restrict fluids for other medical reasons. This will force the medicine into your urinary system and help flush the bacteria out of your body. Ask your healthcare provider how much you should drink.    Avoid sex until you have finished all of your medicine and your symptoms are gone.    Avoid caffeine, alcohol, and spicy foods. These foods may irritate the kidneys and bladder.    Wear loose-fitting clothes and cotton underwear.  Prevention  These self-care steps can help prevent future infections:    Drink plenty of fluids to prevent dehydration and flush out the bladder. Do this unless you must restrict your fluids for other health reasons, or your healthcare provider told you not to.    Proper cleaning after going to the bathroom is important.    Clean your penis regularly. If you aren't circumcised, pull back the foreskin when cleaning.    Urinate more often. Don't try to hold urine in for a long time.    Avoid tight-fitting pants and underwear.    Improve your diet and prevent constipation. Eat more fresh fruit, vegetables, and fiber. Eat less junk and fatty foods. Constipation can increase your chance of getting a urinary tract infection. Talk with your healthcare provider if you have trouble with bowel movements.    Urinate right after sex to flush out the bladder.  Follow-up care  Follow up with your healthcare provider, or as advised. Additional  testing may be needed to make sure the infection is clearing. Close follow-up and further testing is very important to find the cause and to prevent future infections.  If a urine culture was done, you will be contacted if your treatment needs to be changed. If directed, you can call to find out the results.  If you had an X-ray, CT scan, or another diagnostic test, you will be notified of any new findings that may affect your care.  Call 911  Call 911 if any of the following occur:    Trouble breathing    Fainting or loss of consciousness    Rapid or very slow heart rate    Weakness, dizziness, or fainting    Difficulty arousing or confusion  When to seek medical advice  Call your healthcare provider right away if any of the following occur:    Fever of 100.4  F (38  C) or higher, or as directed by your healthcare provider    Not feeling better within 1 to 2 days after starting antibiotics    Any symptom that continues after 3 days of treatment    Increasing pain in the stomach, back, side, or groin area    Repeated vomiting    Not able to take prescribed medicine due to nausea or another reason    Bloody, dark-colored, or foul smelling urine    Trouble urinating or decreased urine output    No urine for 8 hours, no tears when crying, sunken eyes, or dry mouth  Date Last Reviewed: 10/1/2016    5576-6165 [employer]. [address] [place] [address], [place] [address]. All rights reserved. This information is not intended as a substitute for professional medical care. Always follow your healthcare professional's instructions.                Follow-ups after your visit        Who to contact     If you have questions or need follow up information about today's clinic visit or your schedule please contact [facility] directly at [phone].  Normal or non-critical lab and imaging results will be communicated to you by MyChart, letter or phone within 4 business days after the clinic has received the  results. If you do not hear from us within 7 days, please contact the clinic through [employer] or phone. If you have a critical or abnormal lab result, we will notify you by phone as soon as possible.  Submit refill requests through [employer] or call your pharmacy and they will forward the refill request to us. Please allow 3 business days for your refill to be completed.          Additional Information About Your Visit        [employer] Information     [employer] gives you secure access to your electronic health record. If you see a primary care provider, you can also send messages to your care team and make appointments. If you have questions, please call your primary care clinic.  If you do not have a primary care provider, please call [phone] and they will assist you.        Care EveryWhere ID     This is your Care EveryWhere ID. This could be used by other organizations to access your [place] medical records  [id number]        Your Vitals Were     Pulse Temperature Pulse Oximetry BMI (Body Mass Index)          89 97.5  F (36.4  C) (Oral) 95% 33.48 kg/m2         Blood Pressure from Last 3 Encounters:   05/24/18 142/76   09/06/17 147/85   04/22/14 110/68    Weight from Last 3 Encounters:   05/24/18 189 lb (85.7 kg)   09/06/17 192 lb 1.6 oz (87.1 kg)   04/22/14 160 lb (72.6 kg)              We Performed the Following     Basic metabolic panel  (Ca, Cl, CO2, Creat, Gluc, K, Na, BUN)     UA reflex to Microscopic     Urine Microscopic     WBC count          Today's Medication Changes          These changes are accurate as of 5/24/18  3:12 PM.  If you have any questions, ask your nurse or doctor.               Start taking these medicines.        Dose/Directions    ciprofloxacin 500 MG tablet   Commonly known as:  CIPRO   Used for:  Urinary tract infection with hematuria, site unspecified   Started by:  Diego Alexander PA-C        Dose:  500 mg   Take 1 tablet (500 mg) by mouth 2 times daily   Quantity:  20  tablet   Refills:  0            Where to get your medicines      [facility] [place], [place] - [address] AT [address]  [address], [facility] [address]     Phone:  [phone]     ciprofloxacin 500 MG tablet                Primary Care Provider Office Phone # Fax #    Sima Arana [phone] [address] [place] [address]        Equal Access to Services     [facility]: Hadii aad ku hadasho Soomaali, waaxda luqadaha, qaybta kaalmada adeegyada, waxay idiin hayaan adeeg kharagracy montelongo . So [facility] [phone].    ATENCIÓN: Si habla español, tiene a whitten disposición servicios gratuitos de asistencia lingüística. [facility] [phone].    We comply with applicable federal civil rights laws and [place] laws. We do not discriminate on the basis of race, color, national origin, age, disability, sex, sexual orientation, or gender identity.            Thank you!     Thank you for choosing [facility]  for your care. Our goal is always to provide you with excellent care. Hearing back from our patients is one way we can continue to improve our services. Please take a few minutes to complete the written survey that you may receive in the mail after your visit with us. Thank you!             Your Updated Medication List - Protect others around you: Learn how to safely use, store and throw away your medicines at www.disposemymeds.org.          This list is accurate as of 5/24/18  3:12 PM.  Always use your most recent med list.                   Brand Name Dispense Instructions for use Diagnosis    * ACE/ARB/ARNI NOT PRESCRIBED (INTENTIONAL)      1 each continuous prn ACE & ARB not prescribed due to Worsening renal function on ACE/ARB therapy    Type 2 diabetes, HbA1C goal < 8% (H), CKD (chronic kidney disease) stage 3, GFR 30-59 ml/min       albuterol 108 (90 Base) MCG/ACT Inhaler     PROAIR HFA/PROVENTIL HFA/VENTOLIN HFA    1 Inhaler    Inhale 2 puffs into the lungs every 4 hours as needed for shortness of breath / dyspnea or wheezing    Wheezing       ALDACTONE 25 MG tablet   Generic drug:  spironolactone      Take 1 tablet by mouth daily.        amoxicillin 500 MG capsule    AMOXIL          aspirin 81 MG tablet     30 tablet    Take 1 tablet (81 mg) by mouth daily    Type 2 diabetes, HbA1C goal < 8% (H)       blood glucose monitoring lancets     100 each    1 Device 2 times daily    Type 2 diabetes, HbA1C goal < 8% (H)       blood glucose monitoring test strip    ACCU-CHEK SMARTVIEW    1 Box    Test 2 times a day    Type 2 diabetes, HbA1C goal < 8% (H)       CALCIUM 1000 + D 1000-800 MG-UNIT Tabs   Generic drug:  Calcium Carb-Cholecalciferol           calcium-vitamin D 600-400 MG-UNIT per tablet    CALTRATE     Take 1 tablet by mouth 2 times daily.        ciprofloxacin 500 MG tablet    CIPRO    20 tablet    Take 1 tablet (500 mg) by mouth 2 times daily    Urinary tract infection with hematuria, site unspecified       fluticasone 50 MCG/ACT spray    FLONASE    3 Bottle    Spray 1-2 sprays into both nostrils daily    Viral upper respiratory tract infection       furosemide 20 MG tablet    LASIX     Take 20 mg by mouth daily.        omega 3 1000 MG Caps           * STATIN NOT PRESCRIBED (INTENTIONAL)     0 each    1 each continuous prn Statin not prescribed intentionally due to Clinically Significant elevated serum transaminases (AST/ALT) or creatine kinase    Type 2 diabetes, HbA1C goal < 8% (H), CARDIOVASCULAR SCREENING; LDL GOAL LESS THAN 100       * Notice:  This list has 2 medication(s) that are the same as other medications prescribed for you. Read the directions carefully, and ask your doctor or other care provider to review them with you.

## 2018-05-25 ENCOUNTER — TELEPHONE (OUTPATIENT)
Dept: PEDIATRICS | Facility: CLINIC | Age: 58
End: 2018-05-25

## 2018-05-25 NOTE — TELEPHONE ENCOUNTER
[facility] Triage Call  Presenting Problem: Wife calling for results.  Patient Recommendations/Teaching:Gave what was back and reviewed per MD. Otherwise advised to call PCP in am for other results. Patient is currently being treated for a UTI, see epic. Denies new sx since seen. No triage was needed.  Janet Oconnor RN [facility]

## 2018-05-25 NOTE — TELEPHONE ENCOUNTER
Please review labs and call patient with the results.    Urszula Patel RN  Message handled by Nurse Triage.

## 2019-09-30 ENCOUNTER — HEALTH MAINTENANCE LETTER (OUTPATIENT)
Age: 59
End: 2019-09-30

## 2019-12-11 ENCOUNTER — OFFICE VISIT (OUTPATIENT)
Dept: SLEEP MEDICINE | Facility: CLINIC | Age: 59
End: 2019-12-11
Payer: COMMERCIAL

## 2019-12-11 ENCOUNTER — HOSPITAL ENCOUNTER (EMERGENCY)
Facility: CLINIC | Age: 59
Discharge: LEFT BEFORE DISCHARGE | End: 2019-12-11

## 2019-12-11 VITALS
RESPIRATION RATE: 18 BRPM | OXYGEN SATURATION: 98 % | TEMPERATURE: 98.3 F | DIASTOLIC BLOOD PRESSURE: 110 MMHG | SYSTOLIC BLOOD PRESSURE: 225 MMHG | HEART RATE: 89 BPM

## 2019-12-11 VITALS
DIASTOLIC BLOOD PRESSURE: 89 MMHG | RESPIRATION RATE: 16 BRPM | HEIGHT: 63 IN | BODY MASS INDEX: 31.89 KG/M2 | OXYGEN SATURATION: 94 % | WEIGHT: 180 LBS | HEART RATE: 81 BPM | SYSTOLIC BLOOD PRESSURE: 201 MMHG

## 2019-12-11 DIAGNOSIS — G47.9 SLEEP DISTURBANCE: Primary | ICD-10-CM

## 2019-12-11 PROCEDURE — 99203 OFFICE O/P NEW LOW 30 MIN: CPT | Performed by: INTERNAL MEDICINE

## 2019-12-11 ASSESSMENT — MIFFLIN-ST. JEOR: SCORE: 1526.6

## 2019-12-11 NOTE — PROGRESS NOTES
Sleep Consultation Note:    Date on this visit: 12/11/2019    Jeffrey Skaggs is sent by [facility] for a sleep consultation regarding fatigue, evaluation for possible sleep apnea    Primary Physician: Sima Arana     Chief complaint: Tired during much of the day    Jeffrey Skaggs 59 year old with PMH of hypertension, diabetes mellitus type 2, cirrhosis of liver, CKD who complains of fatigue during most of the day for at least 9+ years.  He has not had a previous sleep study.    Jeffrey reports snoring, observed apneas during sleep and dry mouth.  He denies snort arousals, choking/gasping for air or morning headaches.   He sleeps in all body positions.    He goes to bed is pretty much the same time daily between 10-11 PM.  During workdays he wakes up with an alarm at 6 AM.  During weekends even though he may still wake up at 6 AM he tends to fall back asleep and gets couple hours more of extra sleep.  He is able to fall asleep in 5 minutes.  He reports 1-2 nocturnal awakenings either to take the dog out or to use the bathroom but is able to fall back asleep in 5 minutes after the awakening.  He does not always feel refreshed upon awakening from sleep.  He is tired as the day goes by and reports excessive daytime sleepiness though it does not correlate with the [place] sleepiness score that he endorses at 8 out of 24.   If he had an opportunity he tends to nap after returning home from work  and feels better after he wakes up from the nap.    He denies drowsiness while driving.  He denies head bobbing, falling asleep at stoplight or drifting off the central line while driving.    Jeffrey does not complain of restlessness feelings in the legs.  But he reports leg cramps occasionally.    Patient does not use electronics in bed.    He denies any cataplexy, sleep paralysis, sleep hallucinations.    He denies any night time behaviors - sleep walking, sleep talking, sleep eating.    He does not complain acting out  dreams.      Social History  Jeffrey is .  He lives with his wife.  Currently works in IT.  His usual work hours are from 6:30 AM to 3 PM or from 8 AM until 4:30 PM.  However sometimes he may work as late as 9 PM.  He drinks caffeine very rarely.  He  has not had alcohol since 2013. Patient is a never smoker. Patient does not use drugs.    Allergies:    Allergies   Allergen Reactions     Seasonal Allergies        Medications:    Current Outpatient Medications   Medication Sig Dispense Refill     ACCU-CHEK FASTCLIX LANCETS MISC 1 Device 2 times daily (Patient not taking: Reported on 5/24/2018) 100 each 6     ACE/ARB NOT PRESCRIBED, INTENTIONAL, 1 each continuous prn ACE & ARB not prescribed due to Worsening renal function on ACE/ARB therapy (Patient not taking: Reported on 5/24/2018)       albuterol (PROAIR HFA/PROVENTIL HFA/VENTOLIN HFA) 108 (90 BASE) MCG/ACT Inhaler Inhale 2 puffs into the lungs every 4 hours as needed for shortness of breath / dyspnea or wheezing (Patient not taking: Reported on 5/24/2018) 1 Inhaler 1     amoxicillin (AMOXIL) 500 MG capsule        aspirin 81 MG tablet Take 1 tablet (81 mg) by mouth daily 30 tablet 11     Calcium Carb-Cholecalciferol (CALCIUM 1000 + D) 1000-800 MG-UNIT TABS        calcium-vitamin D (CALTRATE) 600-400 MG-UNIT per tablet Take 1 tablet by mouth 2 times daily.       ciprofloxacin (CIPRO) 500 MG tablet Take 1 tablet (500 mg) by mouth 2 times daily (Patient not taking: Reported on 12/11/2019) 20 tablet 0     fluticasone (FLONASE) 50 MCG/ACT spray Spray 1-2 sprays into both nostrils daily (Patient not taking: Reported on 5/24/2018) 3 Bottle 1     Furosemide (LASIX) 20 MG tablet Take 20 mg by mouth daily.        glucose blood VI test strips (ACCU-CHEK SMARTVIEW) strip Test 2 times a day (Patient not taking: Reported on 9/6/2017) 1 Box 12     omega 3 1000 MG CAPS        spironolactone (ALDACTONE) 25 MG tablet Take 1 tablet by mouth daily.        STATIN NOT PRESCRIBED,  INTENTIONAL, 1 each continuous prn Statin not prescribed intentionally due to Clinically Significant elevated serum transaminases (AST/ALT) or creatine kinase (Patient not taking: Reported on 5/24/2018) 0 each 0       Problem List:  Patient Active Problem List    Diagnosis Date Noted     Hyperlipidemia LDL goal <100 10/09/2015     Priority: Medium     Cirrhosis of liver (H) 04/11/2013     Priority: Medium     CKD (chronic kidney disease) stage 3, GFR 30-59 ml/min (H) 02/05/2013     Priority: Medium     Type 2 diabetes mellitus with stage 2 chronic kidney disease (H) 10/31/2010     Priority: Medium     Abnormal liver function tests 05/12/2008     Priority: Medium     (Problem list name updated by automated process. Provider to review and confirm.)       Essential hypertension, benign 06/21/2004     Priority: Medium        Past Medical/Surgical History:  Past Medical History:   Diagnosis Date     BENIGN HYPERTENSION 6/21/2004     CARDIOVASCULAR SCREENING; LDL GOAL LESS THAN 100 10/31/2010     CKD (chronic kidney disease) stage 3, GFR 30-59 ml/min (H) 2/5/2013     Liver function tests abnormal 5/12/2008     Type 2 diabetes, HbA1C goal < 8% (H) 10/31/2010     No past surgical history on file.    Social History:  Social History     Socioeconomic History     Marital status: Single     Spouse name: Not on file     Number of children: Not on file     Years of education: Not on file     Highest education level: Not on file   Occupational History     Not on file   Social Needs     Financial resource strain: Not on file     Food insecurity:     Worry: Not on file     Inability: Not on file     Transportation needs:     Medical: Not on file     Non-medical: Not on file   Tobacco Use     Smoking status: Never Smoker     Smokeless tobacco: Never Used   Substance and Sexual Activity     Alcohol use: Yes     Comment: stopped2-3 weeks ago     Drug use: No     Sexual activity: Yes   Lifestyle     Physical activity:     Days per week:  "Not on file     Minutes per session: Not on file     Stress: Not on file   Relationships     Social connections:     Talks on phone: Not on file     Gets together: Not on file     Attends [employer] service: Not on file     Active member of club or organization: Not on file     Attends meetings of clubs or organizations: Not on file     Relationship status: Not on file     Intimate partner violence:     Fear of current or ex partner: Not on file     Emotionally abused: Not on file     Physically abused: Not on file     Forced sexual activity: Not on file   Other Topics Concern     Parent/sibling w/ CABG, MI or angioplasty before 65F 55M? Yes     Comment: father had bypass mid 70's   Social History Narrative     Not on file       Family History:  Family History   Problem Relation Age of Onset     [email or web address] Father         first MI at age 73     Diabetes Mother      [email or web address]. Paternal Grandfather         first MI in his 30s, from which he        Review of Systems:  A complete review of systems reviewed by me is negative with the exeption of what has been mentioned in the history of present illness and symptoms listed below:  Weight gain   runny nose due to allergies and a couple times of the year he reports bloody nose  High blood pressure(not on any medications for several years)  Rash on his left leg  Occasional cough  which is which he thinks is allergy related  Very rarely abdominal pain   Lower back pain      Physical Examination:  Vitals: BP (!) 201/89   Pulse 81   Resp 16   Ht 1.6 m (5' 3\")   Wt 81.6 kg (180 lb)   SpO2 94%   BMI 31.89 kg/m    BMI= Body mass index is 31.89 kg/m .    Neck Cir (cm): 44 cm    [place] Total Score 2019   Total score - [place] 8       General: No apparent distress, appropriately groomed  Head: Normocephalic, atraumatic  Eyes: no icterus, PERRL  Nose: Inferior nasal turbinate hypertrophy bilaterally with reduced airflow through the right nostril   mouth: op pink and " moist,Orophraynx: Opening is narrowed, uvula: Thick; narrow Velopharynx   Mallampati Class:IV   Tonsillar Stage: 0  Neck: Supple, Circumference: 17 inches  Cardiac: Regular rate and rhythm  Chest: Symmetric air movement, lungs clear to auscultation bilaterally  Musculoskeletal: no edema noted  Skin: Rash on his right lower extremity.  Psych: Mood pleasant, affect congruent  Neuro:  Mental status: Awake, alert, attentive, oriented.  Motor: Tone within normal limit  Gait: Normal width, stride length   No focal neurological deficit      Impression/Plan:  Snoring, observed apneas, nonrestorative sleep with fatigue and excessive daytime sleepiness even though it does not correlate with the [place] sleepiness score that he endorses, in the setting of obesity, hypertension and diabetes mellitus type 2 . Possible Obstructive sleep apnea.    STOP BANG score is 8/8.  Patient likely has sleep apnea based on clinical history, male gender, body habitus, neck circumference, nasal and oropharyngeal anatomy and history of hypertension.  Recommend HST as patient is high risk for SANTY .  If HST is negative for SANTY, will consider supervised polysomnography and patient was agreeable with the plan.  Polysomnography reviewed.  Obstructive sleep apnea reviewed.  Complications of untreated sleep apnea were reviewed .   Treatment for SANTY have been discussed.     Hypertension: When he arrived to the sleep clinic initially his blood pressure was significantly elevated SBP of 201 and DBP of 89 mmHg.  He was asymptomatic,  but he was sent to the emergency room for further evaluation.  He had work-up at the emergency room and was discharged from the ER after which  this visit was completed.  Patient was instructed to closely monitor his blood pressure,  maintain logs and follow-up with his primary care provider for optimizing management of hypertension.  We discussed the association of sleep apnea with hypertension and other cardiovascular  "disease.    Encouraged to follow good sleep hygiene/behavioral techniques.  Recommended following a regular sleep schedule aiming at obtaining 7 to 8 hours of sleep per night.     Patient is a life long non-smoker  and has been encouraged to continue to not smoke.    We discussed weight management with healthy diet, and exercise.    Patient was strongly advised to avoid driving, operating any heavy machinery or other hazardous situations while drowsy or sleepy.  Patient was counseled on the importance of driving while alert, to pull over if drowsy, or nap before getting into the vehicle if sleepy.        Plan is to f/u HST results  via [facility] in 10-14 days after test is completed.        CC: [facility]        The above note was dictated using voice recognition software. Although reviewed after completion, some word and grammatical error may remain . Please contact the author for any clarifications.    \"I spent a total of 30 minutes face to face with Jeffrey Skaggs during today's office visit. Over 50% of this time was spent counseling the patient and  coordinating care regarding SANTY, HST/PSG, weight management, hypertension\"  Rachelle Zapata MD  [facility] [address] [address] [phone]  Dept: [phone]                      "

## 2019-12-11 NOTE — NURSING NOTE
"Chief Complaint   Patient presents with     Consult     snoring, tired during the day       Initial BP (!) 196/84   Pulse 89   Resp 16   Ht 1.6 m (5' 3\")   Wt 81.6 kg (180 lb)   SpO2 95%   BMI 31.89 kg/m   Estimated body mass index is 31.89 kg/m  as calculated from the following:    Height as of this encounter: 1.6 m (5' 3\").    Weight as of this encounter: 81.6 kg (180 lb).    Medication Reconciliation: complete    Neck circumference: 17.5 inches / 44 centimeters.    ESS 8    Thelma Kwon MA      "

## 2019-12-11 NOTE — ED TRIAGE NOTES
Patient sent from sleep study for high blood pressure. Patient denies pain, headache, dizziness, or vision changes. EKG done in triage. ABC's intact.

## 2019-12-11 NOTE — PATIENT INSTRUCTIONS
Your BMI is Body mass index is 31.89 kg/m .  Weight management is a personal decision.  If you are interested in exploring weight loss strategies, the following discussion covers the approaches that may be successful. Body mass index (BMI) is one way to tell whether you are at a healthy weight, overweight, or obese. It measures your weight in relation to your height.  A BMI of 18.5 to 24.9 is in the healthy range. A person with a BMI of 25 to 29.9 is considered overweight, and someone with a BMI of 30 or greater is considered obese. More than two-thirds of American adults are considered overweight or obese.  Being overweight or obese increases the risk for further weight gain. Excess weight may lead to heart disease and diabetes.  Creating and following plans for healthy eating and physical activity may help you improve your health.  Weight control is part of healthy lifestyle and includes exercise, emotional health, and healthy eating habits. Careful eating habits lifelong are the mainstay of weight control. Though there are significant health benefits from weight loss, long-term weight loss with diet alone may be very difficult to achieve- studies show long-term success with dietary management in less than 10% of people. Attaining a healthy weight may be especially difficult to achieve in those with severe obesity. In some cases, medications, devices and surgical management might be considered.  What can you do?  If you are overweight or obese and are interested in methods for weight loss, you should discuss this with your provider.     Consider reducing daily calorie intake by 500 calories.     Keep a food journal.     Avoiding skipping meals, consider cutting portions instead.    Diet combined with exercise helps maintain muscle while optimizing fat loss. Strength training is particularly important for building and maintaining muscle mass. Exercise helps reduce stress, increase energy, and improves fitness.  "Increasing exercise without diet control, however, may not burn enough calories to loose weight.       Start walking three days a week 10-20 minutes at a time    Work towards walking thirty minutes five days a week     Eventually, increase the speed of your walking for 1-2 minutes at time    In addition, we recommend that you review healthy lifestyles and methods for weight loss available through the National Institutes of Health patient information sites:  http://win.niddk.nih.gov/publications/index.htm    And look into health and wellness programs that may be available through your health insurance provider, employer, local community center, or stepan club.    Weight management plan: Patient was referred to their PCP to discuss a diet and exercise plan.  MY TREATMENT INFORMATION FOR SLEEP APNEA-  Jeffrey Skaggs    DOCTOR : Rachelle Zapata MD  SLEEP CENTER :      MY CONTACT NUMBER:     Am I having a sleep study at a sleep center?  Make sure you have an appointment for the study before you leave!    Am I having a home sleep study?  Watch this video:  https://[email or web address]/watch?v=CteI_GhyP9g&list=[email or web address]  Please verify your insurance coverage with your insurance carrier    Frequently asked questions:  1. What is Obstructive Sleep Apnea (SANTY)? SANTY is the most common type of sleep apnea. Apnea means, \"without breath.\"  Apnea is most often caused by narrowing or collapse of the upper airway as muscles relax during sleep.   Almost everyone has occasional apneas. Most people with sleep apnea have had brief interruptions at night frequently for many years.  The severity of sleep apnea is related to how frequent and severe the events are.   2. What are the consequences of SANTY? Symptoms include: feeling sleepy during the day, snoring loudly, gasping or stopping of breathing, trouble sleeping, and occasionally morning headaches or heartburn at night.  Sleepiness can be " serious and even increase the risk of falling asleep while driving. Other health consequences may include development of high blood pressure and other cardiovascular disease in persons who are susceptible. Untreated SANTY  can contribute to heart disease, stroke and diabetes.   3. What are the treatment options? In most situations, sleep apnea is a lifelong disease that must be managed with daily therapy. Medications are not effective for sleep apnea and surgery is generally not considered until other therapies have been tried. Your treatment is your choice . Continuous Positive Airway (CPAP) works right away and is the therapy that is effective in nearly everyone. An oral device to hold your jaw forward is usually the next most reliable option. Other options include postioning devices (to keep you off your back), weight loss, and surgery including a tongue pacing device. There is more detail about some of these options below.    Important tips for using CPAP and similar devices   Know your equipment:  CPAP is continuous positive airway pressure that prevents obstructive sleep apnea by keeping the throat from collapsing while you are sleeping. In most cases, the device is  smart  and can slowly self-adjusts if your throat collapses and keeps a record every day of how well you are treated-this information is available to you and your care team.  BPAP is bilevel positive airway pressure that keeps your throat open and also assists each breath with a pressure boost to maintain adequate breathing.  Special kinds of BPAP are used in patients who have inadequate breathing from lung or heart disease. In most cases, the device is  smart  and can slowly self-adjusts to assist breathing. Like CPAP, the device keeps a record of how well you are treated.  Your mask is your connection to the device. You get to choose what feels most comfortable and the staff will help to make sure if fits. Here: are some examples of the different  masks that are available:       Key points to remember on your journey with sleep apnea:  1. Sleep study.  PAP devices often need to be adjusted during a sleep study to show that they are effective and adjusted right.  2. Good tips to remember: Try wearing just the mask during a quiet time during the day so your body adapts to wearing it. A humidifier is recommended for comfort in most cases to prevent drying of your nose and throat. Allergy medication from your provider may help you if you are having nasal congestion.  3. Getting settled-in. It takes more than one night for most of us to get used to wearing a mask. Try wearing just the mask during a quiet time during the day so your body adapts to wearing it. A humidifier is recommended for comfort in most cases. Our team will work with you carefully on the first day and will be in contact within 4 days and again at 2 and 4 weeks for advice and remote device adjustments. Your therapy is evaluated by the device each day.   4. Use it every night. The more you are able to sleep naturally for 7-8 hours, the more likely you will have good sleep and to prevent health risks or symptoms from sleep apnea. Even if you use it 4 hours it helps. Occasionally all of us are unable to use a medical therapy, in sleep apnea, it is not dangerous to miss one night.   5. Communicate. Call our skilled team on the number provided on the first day if your visit for problems that make it difficult to wear the device. Over 2 out of 3 patients can learn to wear the device long-term with help from our team. Remember to call our team or your sleep providers if you are unable to wear the device as we may have other solutions for those who cannot adapt to mask CPAP therapy. It is recommended that you sleep your sleep provider within the first 3 months and yearly after that if you are not having problems.   6. Use it for your health. We encourage use of CPAP masks during daytime quiet periods to  allow your face and brain to adapt to the sensation of CPAP so that it will be a more natural sensation to awaken to at night or during naps. This can be very useful during the first few weeks or months of adapting to CPAP though it does not help medically to wear CPAP during wakefulness and  should not be used as a strategy just to meet guidelines.  7. Take care of your equipment. Make sure you clean your mask and tubing using directions every day and that your filter and mask are replaced as recommended or if they are not working.     BESIDES CPAP, WHAT OTHER THERAPIES ARE THERE?    Positioning Device  Positioning devices are generally used when sleep apnea is mild and only occurs on your back.This example shows a pillow that straps around the waist. It may be appropriate for those whose sleep study shows milder sleep apnea that occurs primarily when lying flat on one's back. Preliminary studies have shown benefit but effectiveness at home may need to be verified by a home sleep test. These devices are generally not covered by medical insurance.  Examples of devices that maintain sleeping on the back to prevent snoring and mild sleep apnea.    Belt type body positioner  Http://[employer]/    Electronic reminder  Http://nightshifttherapy.com/  Http://www.e|tab.[employer].au/      Oral Appliance  What is oral appliance therapy?  An oral appliance device fits on your teeth at night like a retainer used after having braces. The device is made by a specialized dentist and requires several visits over 1-2 months before a manufactured device is made to fit your teeth and is adjusted to prevent your sleep apnea. Once an oral device is working properly, snoring should be improved. A home sleep test may be recommended at that time if to determine whether the sleep apnea is adequately treated.       Some things to remember:  -Oral devices are often, but not always, covered by your medical insurance. Be sure to check with your  insurance provider.   -If you are referred for oral therapy, you will be given a list of specialized dentists to consider or you may choose to visit the Web site of the American Academy of Dental Sleep Medicine  -Oral devices are less likely to work if you have severe sleep apnea or are extremely overweight.     More detailed information  An oral appliance is a small acrylic device that fits over the upper and lower teeth  (similar to a retainer or a mouth guard). This device slightly moves jaw forward, which moves the base of the tongue forward, opens the airway, improves breathing for effective treat snoring and obstructive sleep apnea in perhaps 7 out of 10 people .  The best working devices are custom-made by a dental device  after a mold is made of the teeth 1, 2, 3.  When is an oral appliance indicated?  Oral appliance therapy is recommended as a first-line treatment for patients with primary snoring, mild sleep apnea, and for patients with moderate sleep apnea who prefer appliance therapy to use of CPAP4, 5. Severity of sleep apnea is determined by sleep testing and is based on the number of respiratory events per hour of sleep.   How successful is oral appliance therapy?  The success rate of oral appliance therapy in patients with mild sleep apnea is 75-80% while in patients with moderate sleep apnea it is 50-70%. The chance of success in patients with severe sleep apnea is 40-50%. The research also shows that oral appliances have a beneficial effect on the cardiovascular health of SANTY patients at the same magnitude as CPAP therapy7.  Oral appliances should be a second-line treatment in cases of severe sleep apnea, but if not completely successful then a combination therapy utilizing CPAP plus oral appliance therapy may be effective. Oral appliances tend to be effective in a broad range of patients although studies show that the patients who have the highest success are females, younger patients,  those with milder disease, and less severe obesity. 3, 6.   Finding a dentist that practices dental sleep medicine  Specific training is available through the American Academy of Dental Sleep Medicine for dentists interested in working in the field of sleep. To find a dentist who is educated in the field of sleep and the use of oral appliances, near you, visit the Web site of the American Academy of Dental Sleep Medicine.    References  1. Daksha, et al. Objectively measured vs self-reported compliance during oral appliance therapy for sleep-disordered breathing. Chest 2013; 144(5): 2603-1468.  2. Hua et al. Objective measurement of compliance during oral appliance therapy for sleep-disordered breathing. Thorax 2013; 68(1): 91-96.  3. Georgia et al. Mandibular advancement devices in 620 men and women with SANTY and snoring: tolerability and predictors of treatment success. Chest 2004; 125: 8610-8363.  4. Compa, et al. Oral appliances for snoring and SANTY: a review. Sleep 2006; 29: 244-262.  5. Sujata et al. Oral appliance treatment for SANTY: an update. J Clin Sleep Med 2014; 10(2): 215-227.  6. Ruiz, et al. Predictors of OSAH treatment outcome. J Dent Res 2007; 86: 3202-1927.      Weight Loss:    Weight loss is a long-term strategy that may improve sleep apnea in some patients.    Weight management is a personal decision and the decision should be based on your interest and the potential benefits.  If you are interested in exploring weight loss strategies, the following discussion covers the impact on weight loss on sleep apnea and the approaches that may be successful.    Being overweight does not necessarily mean you will have health consequences.  Those who have BMI over 35 or over 27 with existing medical conditions carries greater risk.   Weight loss decreases severity of sleep apnea in most people with obesity. For those with mild obesity who have developed snoring with weight gain, even  15-30 pound weight loss can improve and occasionally eliminate sleep apnea.  Structured and life-long dietary and health habits are necessary to lose weight and keep healthier weight levels.     Though there may be significant health benefits from weight loss, long-term weight loss is very difficult to achieve- studies show success with dietary management in less than 10% of people. In addition, substantial weight loss may require years of dietary control and may be difficult if patients have severe obesity. In these cases, surgical management may be considered.  Finally, older individuals who have tolerated obesity without health complications may be less likely to benefit from weight loss strategies.        Your BMI is Body mass index is 31.89 kg/m .  Weight management is a personal decision.  If you are interested in exploring weight loss strategies, the following discussion covers the approaches that may be successful. Body mass index (BMI) is one way to tell whether you are at a healthy weight, overweight, or obese. It measures your weight in relation to your height.  A BMI of 18.5 to 24.9 is in the healthy range. A person with a BMI of 25 to 29.9 is considered overweight, and someone with a BMI of 30 or greater is considered obese. More than two-thirds of American adults are considered overweight or obese.  Being overweight or obese increases the risk for further weight gain. Excess weight may lead to heart disease and diabetes.  Creating and following plans for healthy eating and physical activity may help you improve your health.  Weight control is part of healthy lifestyle and includes exercise, emotional health, and healthy eating habits. Careful eating habits lifelong are the mainstay of weight control. Though there are significant health benefits from weight loss, long-term weight loss with diet alone may be very difficult to achieve- studies show long-term success with dietary management in less than  10% of people. Attaining a healthy weight may be especially difficult to achieve in those with severe obesity. In some cases, medications, devices and surgical management might be considered.  What can you do?  If you are overweight or obese and are interested in methods for weight loss, you should discuss this with your provider.     Consider reducing daily calorie intake by 500 calories.     Keep a food journal.     Avoiding skipping meals, consider cutting portions instead.    Diet combined with exercise helps maintain muscle while optimizing fat loss. Strength training is particularly important for building and maintaining muscle mass. Exercise helps reduce stress, increase energy, and improves fitness. Increasing exercise without diet control, however, may not burn enough calories to loose weight.       Start walking three days a week 10-20 minutes at a time    Work towards walking thirty minutes five days a week     Eventually, increase the speed of your walking for 1-2 minutes at time    In addition, we recommend that you review healthy lifestyles and methods for weight loss available through the National Institutes of Health patient information sites:  http://win.niddk.nih.gov/publications/index.htm    And look into health and wellness programs that may be available through your health insurance provider, employer, local community center, or stepan club.          Surgery:    Surgery for obstructive sleep apnea is considered generally only when other therapies fail to work. Surgery may be discussed with you if you are having a difficult time tolerating CPAP and or when there is an abnormal structure that requires surgical correction.  Nose and throat surgeries often enlarge the airway to prevent collapse.  Most of these surgeries create pain for 1-2 weeks and up to half of the most common surgeries are not effective throughout life.  You should carefully discuss the benefits and drawbacks to surgery with your  sleep provider and surgeon to determine if it is the best solution for you.   More information  Surgery for SANTY is directed at areas that are responsible for narrowing or complete obstruction of the airway during sleep.  There are a wide range of procedures available to enlarge and/or stabilize the airway to prevent blockage of breathing in the three major areas where it can occur: the palate, tongue, and nasal regions.  Successful surgical treatment depends on the accurate identification of the factors responsible for obstructive sleep apnea in each person.  A personalized approach is required because there is no single treatment that works well for everyone.  Because of anatomic variation, consultation with an examination by a sleep surgeon is a critical first step in determining what surgical options are best for each patient.  In some cases, examination during sedation may be recommended in order to guide the selection of procedures.  Patients will be counseled about risks and benefits as well as the typical recovery course after surgery. Surgery is typically not a cure for a person s SANTY.  However, surgery will often significantly improve one s SANTY severity (termed  success rate ).  Even in the absence of a cure, surgery will decrease the cardiovascular risk associated with OSA7; improve overall quality of life8 (sleepiness, functionality, sleep quality, etc).      Palate Procedures:  Patients with SANTY often have narrowing of their airway in the region of their tonsils and uvula.  The goals of palate procedures are to widen the airway in this region as well as to help the tissues resist collapse.  Modern palate procedure techniques focus on tissue conservation and soft tissue rearrangement, rather than tissue removal.  Often the uvula is preserved in this procedure. Residual sleep apnea is common in patient after pharyngoplasty with an average reduction in sleep apnea events of 33%2.      Tongue  Procedures:  ExamWhile patients are awake, the muscles that surround the throat are active and keep this region open for breathing. These muscles relax during sleep, allowing the tongue and other structures to collapse and block breathing.  There are several different tongue procedures available.  Selection of a tongue base procedure depends on characteristics seen on physical exam.  Generally, procedures are aimed at removing bulky tissues in this area or preventing the back of the tongue from falling back during sleep.  Success rates for tongue surgery range from 50-62%3.    Hypoglossal Nerve Stimulation:  Hypoglossal nerve stimulation has recently received approval from the [place] Food and Drug Administration for the treatment of obstructive sleep apnea.  This is based on research showing that the system was safe and effective in treating sleep apnea6.  Results showed that the median AHI score decreased 68%, from 29.3 to 9.0. This therapy uses an implant system that senses breathing patterns and delivers mild stimulation to airway muscles, which keeps the airway open during sleep.  The system consists of three fully implanted components: a small generator (similar in size to a pacemaker), a breathing sensor, and a stimulation lead.  Using a small handheld remote, a patient turns the therapy on before bed and off upon awakening.    Candidates for this device must be greater than 22 years of age, have moderate to severe SANTY (AHI between 20-65), BMI less than 32, have tried CPAP/oral appliance without success, and have appropriate upper airway anatomy (determined by a sleep endoscopy performed by Dr. Manrique).    Hypoglossal Nerve Stimulation Pathway:    The sleep surgeon s office will work with the patient through the insurance prior-authorization process (including communications and appeals).    Nasal Procedures:  Nasal obstruction can interfere with nasal breathing during the day and night.  Studies have  shown that relief of nasal obstruction can improve the ability of some patients to tolerate positive airway pressure therapy for obstructive sleep apnea1.  Treatment options include medications such as nasal saline, topical corticosteroid and antihistamine sprays, and oral medications such as antihistamines or decongestants. Non-surgical treatments can include external nasal dilators for selected patients. If these are not successful by themselves, surgery can improve the nasal airway either alone or in combination with these other options.      Combination Procedures:  Combination of surgical procedures and other treatments may be recommended, particularly if patients have more than one area of narrowing or persistent positional disease.  The success rate of combination surgery ranges from 66-80%2,3.    References  1. Refugio WAGONER. The Role of the Nose in Snoring and Obstructive Sleep Apnoea: An Update.  Eur Arch Otorhinolaryngol. 2011; 268: 1365-73.  2.  Lambert SM; Liss JA; Bettye JR; Pallanch JF; Curtis MB; Tonya SG; Marilee GLEASON. Surgical modifications of the upper airway for obstructive sleep apnea in adults: a systematic review and meta-analysis. SLEEP 2010;33(10):4927-7501. Enrique MCMANUS. Hypopharyngeal surgery in obstructive sleep apnea: an evidence-based medicine review.  Arch Otolaryngol Head Neck Surg. 2006 Feb;132(2):206-13.  3. Darwin YH1, Angela Y, Conner EUGENE. The efficacy of anatomically based multilevel surgery for obstructive sleep apnea. Otolaryngol Head Neck Surg. 2003 Oct;129(4):327-35.  4. Enrique MCMANUS, Goldberg A. Hypopharyngeal Surgery in Obstructive Sleep Apnea: An Evidence-Based Medicine Review. Arch Otolaryngol Head Neck Surg. 2006 Feb;132(2):206-13.  5. Agnes PJ et al. Upper-Airway Stimulation for Obstructive Sleep Apnea.  N Engl J Med. 2014 Jan 9;370(2):139-49.  6. Kaci Y et al. Increased Incidence of Cardiovascular Disease in Middle-aged Men with Obstructive Sleep Apnea. Am J Respir Crit Care  Med; 2002 166: 159-165  7. Lew RAZA et al. Studying Life Effects and Effectiveness of Palatopharyngoplasty (SLEEP) study: Subjective Outcomes of Isolated Uvulopalatopharyngoplasty. Otolaryngol Head Neck Surg. 2011; 144: 623-631.

## 2019-12-12 LAB — INTERPRETATION ECG - MUSE: NORMAL

## 2019-12-13 ENCOUNTER — OFFICE VISIT (OUTPATIENT)
Dept: INTERNAL MEDICINE | Facility: CLINIC | Age: 59
End: 2019-12-13
Payer: COMMERCIAL

## 2019-12-13 VITALS
SYSTOLIC BLOOD PRESSURE: 203 MMHG | DIASTOLIC BLOOD PRESSURE: 87 MMHG | RESPIRATION RATE: 16 BRPM | HEIGHT: 63 IN | OXYGEN SATURATION: 100 % | HEART RATE: 106 BPM | WEIGHT: 188.6 LBS | BODY MASS INDEX: 33.42 KG/M2 | TEMPERATURE: 98.2 F

## 2019-12-13 DIAGNOSIS — K74.60 CIRRHOSIS OF LIVER WITHOUT ASCITES, UNSPECIFIED HEPATIC CIRRHOSIS TYPE (H): ICD-10-CM

## 2019-12-13 DIAGNOSIS — E11.22 TYPE 2 DIABETES MELLITUS WITH STAGE 2 CHRONIC KIDNEY DISEASE, WITHOUT LONG-TERM CURRENT USE OF INSULIN (H): ICD-10-CM

## 2019-12-13 DIAGNOSIS — N18.2 TYPE 2 DIABETES MELLITUS WITH STAGE 2 CHRONIC KIDNEY DISEASE, WITHOUT LONG-TERM CURRENT USE OF INSULIN (H): ICD-10-CM

## 2019-12-13 DIAGNOSIS — I10 BENIGN ESSENTIAL HYPERTENSION: Primary | ICD-10-CM

## 2019-12-13 DIAGNOSIS — E78.5 HYPERLIPIDEMIA LDL GOAL <100: ICD-10-CM

## 2019-12-13 DIAGNOSIS — N18.30 CKD (CHRONIC KIDNEY DISEASE) STAGE 3, GFR 30-59 ML/MIN (H): ICD-10-CM

## 2019-12-13 LAB
ALBUMIN SERPL-MCNC: 3.7 G/DL (ref 3.4–5)
ALP SERPL-CCNC: 224 U/L (ref 40–150)
ALT SERPL W P-5'-P-CCNC: 71 U/L (ref 0–70)
ANION GAP SERPL CALCULATED.3IONS-SCNC: 10 MMOL/L (ref 3–14)
AST SERPL W P-5'-P-CCNC: 32 U/L (ref 0–45)
BASOPHILS # BLD AUTO: 0 10E9/L (ref 0–0.2)
BASOPHILS NFR BLD AUTO: 0.4 %
BILIRUB SERPL-MCNC: 0.6 MG/DL (ref 0.2–1.3)
BUN SERPL-MCNC: 18 MG/DL (ref 7–30)
CALCIUM SERPL-MCNC: 8.7 MG/DL (ref 8.5–10.1)
CHLORIDE SERPL-SCNC: 103 MMOL/L (ref 94–109)
CHOLEST SERPL-MCNC: 242 MG/DL
CO2 SERPL-SCNC: 22 MMOL/L (ref 20–32)
CREAT SERPL-MCNC: 0.92 MG/DL (ref 0.66–1.25)
DIFFERENTIAL METHOD BLD: NORMAL
EOSINOPHIL # BLD AUTO: 0.1 10E9/L (ref 0–0.7)
EOSINOPHIL NFR BLD AUTO: 2.3 %
ERYTHROCYTE [DISTWIDTH] IN BLOOD BY AUTOMATED COUNT: 13 % (ref 10–15)
GFR SERPL CREATININE-BSD FRML MDRD: >90 ML/MIN/{1.73_M2}
GLUCOSE SERPL-MCNC: 211 MG/DL (ref 70–99)
HBA1C MFR BLD: 8.8 % (ref 0–5.6)
HCT VFR BLD AUTO: 44.9 % (ref 40–53)
HDLC SERPL-MCNC: 30 MG/DL
HGB BLD-MCNC: 15.8 G/DL (ref 13.3–17.7)
LDLC SERPL CALC-MCNC: ABNORMAL MG/DL
LDLC SERPL DIRECT ASSAY-MCNC: 90 MG/DL
LYMPHOCYTES # BLD AUTO: 1.5 10E9/L (ref 0.8–5.3)
LYMPHOCYTES NFR BLD AUTO: 26.7 %
MCH RBC QN AUTO: 30.7 PG (ref 26.5–33)
MCHC RBC AUTO-ENTMCNC: 35.2 G/DL (ref 31.5–36.5)
MCV RBC AUTO: 87 FL (ref 78–100)
MONOCYTES # BLD AUTO: 0.5 10E9/L (ref 0–1.3)
MONOCYTES NFR BLD AUTO: 8.3 %
NEUTROPHILS # BLD AUTO: 3.5 10E9/L (ref 1.6–8.3)
NEUTROPHILS NFR BLD AUTO: 62.3 %
NONHDLC SERPL-MCNC: 212 MG/DL
PLATELET # BLD AUTO: 158 10E9/L (ref 150–450)
POTASSIUM SERPL-SCNC: 4 MMOL/L (ref 3.4–5.3)
PROT SERPL-MCNC: 7.3 G/DL (ref 6.8–8.8)
RBC # BLD AUTO: 5.15 10E12/L (ref 4.4–5.9)
SODIUM SERPL-SCNC: 135 MMOL/L (ref 133–144)
TRIGL SERPL-MCNC: 1419 MG/DL
WBC # BLD AUTO: 5.7 10E9/L (ref 4–11)

## 2019-12-13 PROCEDURE — 82043 UR ALBUMIN QUANTITATIVE: CPT | Performed by: INTERNAL MEDICINE

## 2019-12-13 PROCEDURE — 80053 COMPREHEN METABOLIC PANEL: CPT | Performed by: INTERNAL MEDICINE

## 2019-12-13 PROCEDURE — 99203 OFFICE O/P NEW LOW 30 MIN: CPT | Performed by: INTERNAL MEDICINE

## 2019-12-13 PROCEDURE — 36415 COLL VENOUS BLD VENIPUNCTURE: CPT | Performed by: INTERNAL MEDICINE

## 2019-12-13 PROCEDURE — 83036 HEMOGLOBIN GLYCOSYLATED A1C: CPT | Performed by: INTERNAL MEDICINE

## 2019-12-13 PROCEDURE — 85025 COMPLETE CBC W/AUTO DIFF WBC: CPT | Performed by: INTERNAL MEDICINE

## 2019-12-13 PROCEDURE — 80061 LIPID PANEL: CPT | Performed by: INTERNAL MEDICINE

## 2019-12-13 PROCEDURE — 83721 ASSAY OF BLOOD LIPOPROTEIN: CPT | Performed by: INTERNAL MEDICINE

## 2019-12-13 RX ORDER — METOPROLOL SUCCINATE 100 MG/1
100 TABLET, EXTENDED RELEASE ORAL DAILY
Qty: 30 TABLET | Refills: 1 | Status: SHIPPED | OUTPATIENT
Start: 2019-12-13 | End: 2020-02-05

## 2019-12-13 RX ORDER — METOPROLOL SUCCINATE 50 MG/1
TABLET, EXTENDED RELEASE ORAL
Qty: 60 TABLET | Refills: 0 | Status: SHIPPED | OUTPATIENT
Start: 2019-12-13 | End: 2020-01-28

## 2019-12-13 ASSESSMENT — MIFFLIN-ST. JEOR: SCORE: 1565.61

## 2019-12-13 NOTE — PROGRESS NOTES
Subjective     Jeffrey Skaggs is a 59 year old male who presents to clinic today for the following health issues:    HPI   Diabetes Follow-up      How often are you checking your blood sugar? Not at all    What concerns do you have today about your diabetes? None     Do you have any of these symptoms? (Select all that apply)  No numbness or tingling in feet.  No redness, sores or blisters on feet.  No complaints of excessive thirst.  No reports of blurry vision.  No significant changes to weight.     Have you had a diabetic eye exam in the last 12 months? No    Diabetes Management Resources    Hyperlipidemia Follow-Up      Are you regularly taking any medication or supplement to lower your cholesterol?   No    Are you having muscle aches or other side effects that you think could be caused by your cholesterol lowering medication?  No    Hypertension Follow-up      Do you check your blood pressure regularly outside of the clinic? Yes     Are you following a low salt diet? Yes    Are your blood pressures ever more than 140 on the top number (systolic) OR more   than 90 on the bottom number (diastolic), for example 140/90? Yes    BP Readings from Last 2 Encounters:   12/11/19 (!) 225/110   12/11/19 (!) 201/89     Hemoglobin A1C (%)   Date Value   04/22/2014 5.7   04/05/2013 5.1     LDL Cholesterol Calculated (mg/dL)   Date Value   04/22/2014 93   01/24/2013     Cannot estimate LDL when triglyceride exceeds 400 mg/dL         How many servings of fruits and vegetables do you eat daily?  0-1    On average, how many sweetened beverages do you drink each day (Examples: soda, juice, sweet tea, etc.  Do NOT count diet or artificially sweetened beverages)?   0    How many days per week do you miss taking your medication? 0    {additonal problems for provider to add (Optional):102970}    {HIST REVIEW/ LINKS 2 (Optional):343683}    {Additional problems for the provider to add (optional):053975}  Reviewed and updated as needed  "this visit by Provider         Review of Systems   {ROS COMP (Optional):387368}      Objective    There were no vitals taken for this visit.  There is no height or weight on file to calculate BMI.  Physical Exam   {Exam List (Optional):436663}    {Diagnostic Test Results (Optional):134770::\"Diagnostic Test Results:\",\"Labs reviewed in Epic\"}        {PROVIDER CHARTING PREFERENCE:080910}        "

## 2019-12-13 NOTE — PROGRESS NOTES
Subjective     Jeffrey Skaggs is a 59 year old male who presents to clinic today for the following health issues:    HPI   ED/UC Followup:    Facility:  [facility]  Date of visit: 12/11/19  Reason for visit: Elevated BP  Current Status: did not see physician     Patient has longstanding history of multiple medical problems, previously followed at [facility] but then he changed insurance, was seeing somebody at [facility] for a while.  He has not actually seen any physician for some time.  He had previously been on antihypertensives and they were eventually taken off due to low blood pressure.  Prior to going to the sleep clinic a few days ago, he had not had a blood pressure check done in quite some time.  He has not had any symptoms of headache, shortness of breath.  Sleep clinic found a blood pressure initially of 225/110, sent him to the ED where he had an EKG done but did not stay to see a physician.    He also has multiple other medical problems that have not been addressed for a long time including cirrhosis of the liver, diabetes type 2, hyperlipidemia.  He had previously had issues with ascites but is not had any problems with abdominal bloating, swelling for a long time.  His last visit with the [facility] clinic was 5/18 for some abdominal pain.    Patient Active Problem List   Diagnosis     Essential hypertension, benign     Abnormal liver function tests     Type 2 diabetes mellitus with stage 2 chronic kidney disease (H)     CKD (chronic kidney disease) stage 3, GFR 30-59 ml/min (H)     Cirrhosis of liver (H)     Hyperlipidemia LDL goal <100     Current Outpatient Medications   Medication Sig Dispense Refill     aspirin 81 MG tablet Take 1 tablet (81 mg) by mouth daily 30 tablet 11      Social History     Tobacco Use     Smoking status: Never Smoker     Smokeless tobacco: Never Used   Substance Use Topics     Alcohol use: Yes     Comment: stopped2-3 weeks ago     Drug use: No      Reviewed and updated as needed  "this visit by Provider         Review of Systems   No fever, chills, chest pain, palpitations, shortness of breath, PND, orthopnea, abdominal bloating, abdominal pain, edema,      Objective    BP (!) 203/87 (BP Location: Right arm, Patient Position: Sitting, Cuff Size: Adult Regular)   Pulse 106   Temp 98.2  F (36.8  C) (Oral)   Resp 16   Ht 1.6 m (5' 3\")   Wt 85.5 kg (188 lb 9.6 oz)   SpO2 100%   BMI 33.41 kg/m    Body mass index is 33.41 kg/m .  Physical Exam     Lungs: Clear  CV: Regular S1, S2 with 2/6 systolic ejection murmur  No edema          Assessment & Plan     1. Benign essential hypertension  He has had elevated blood pressure, likely this is been gradually increasing over the past year and a half.  He is not having acute symptoms with it.  For now we will start on metoprolol low-dose since he had been on that previously without any evidence of problems or side effects.  Previous medications have been amlodipine which was discontinued to change medications due to high blood pressure in 2008.  Lisinopril was stopped in 2013 due to increasing creatinine.  He was on spironolactone when he had his cirrhosis, it was also discontinued when the kidney function was abnormal.  - metoprolol succinate ER (TOPROL-XL) 50 MG 24 hr tablet; 1/2 po daily x3 days, 1 po daily x5-7 days, then 2 po daily  Dispense: 60 tablet; Refill: 0  - metoprolol succinate ER (TOPROL-XL) 100 MG 24 hr tablet; Take 1 tablet (100 mg) by mouth daily  Dispense: 30 tablet; Refill: 1  - Comprehensive metabolic panel (BMP + Alb, Alk Phos, ALT, AST, Total. Bili, TP)  - Albumin Random Urine Quantitative with Creat Ratio  - LDL cholesterol direct  - LDL cholesterol direct    2. Type 2 diabetes mellitus with stage 2 chronic kidney disease, without long-term current use of insulin (H)  He does not check his sugars, unclear control, check lab  - Comprehensive metabolic panel (BMP + Alb, Alk Phos, ALT, AST, Total. Bili, TP)  - Lipid panel reflex " to direct LDL Fasting  - Hemoglobin A1c    3. Cirrhosis of liver without ascites, unspecified hepatic cirrhosis type (H)  Currently this seems to be compensated, check labs  - Comprehensive metabolic panel (BMP + Alb, Alk Phos, ALT, AST, Total. Bili, TP)  - CBC with platelets and differential    4. CKD (chronic kidney disease) stage 3, GFR 30-59 ml/min (H)  Had previously had very high levels, will check labs  - Comprehensive metabolic panel (BMP + Alb, Alk Phos, ALT, AST, Total. Bili, TP)  - Albumin Random Urine Quantitative with Creat Ratio    5. Hyperlipidemia LDL goal <100  Check labs  - Lipid panel reflex to direct LDL Fasting           No follow-ups on file.    Roberta Eric MD  [facility]

## 2019-12-13 NOTE — NURSING NOTE
"BP (!) 203/87 (BP Location: Right arm, Patient Position: Sitting, Cuff Size: Adult Regular)   Pulse 106   Temp 98.2  F (36.8  C) (Oral)   Resp 16   Ht 1.6 m (5' 3\")   Wt 85.5 kg (188 lb 9.6 oz)   SpO2 100%   BMI 33.41 kg/m      "

## 2019-12-14 LAB
CREAT UR-MCNC: 216 MG/DL
MICROALBUMIN UR-MCNC: 6310 MG/L
MICROALBUMIN/CREAT UR: 2921.3 MG/G CR (ref 0–17)

## 2019-12-16 ENCOUNTER — MYC MEDICAL ADVICE (OUTPATIENT)
Dept: INTERNAL MEDICINE | Facility: CLINIC | Age: 59
End: 2019-12-16

## 2019-12-16 DIAGNOSIS — E78.1 HYPERTRIGLYCERIDEMIA: Primary | ICD-10-CM

## 2019-12-16 DIAGNOSIS — E11.9 TYPE 2 DIABETES MELLITUS WITHOUT COMPLICATION, WITHOUT LONG-TERM CURRENT USE OF INSULIN (H): ICD-10-CM

## 2019-12-17 ENCOUNTER — TELEPHONE (OUTPATIENT)
Dept: INTERNAL MEDICINE | Facility: CLINIC | Age: 59
End: 2019-12-17

## 2019-12-17 RX ORDER — FENOFIBRATE 145 MG/1
145 TABLET, COATED ORAL DAILY
Qty: 30 TABLET | Refills: 1 | Status: SHIPPED | OUTPATIENT
Start: 2019-12-17 | End: 2020-02-05

## 2019-12-17 RX ORDER — GLIPIZIDE 10 MG/1
10 TABLET, FILM COATED, EXTENDED RELEASE ORAL DAILY
Qty: 30 TABLET | Refills: 1 | Status: SHIPPED | OUTPATIENT
Start: 2019-12-17 | End: 2020-02-05

## 2019-12-17 NOTE — TELEPHONE ENCOUNTER
Pt's wife (on consent to communicate) calling requesting call back from nurse. She has questions about pt's lab results from 12/13/19. Pt can be reached at [phone]. OK to leave a detailed message. Please advise. Thanks.

## 2019-12-17 NOTE — TELEPHONE ENCOUNTER
See MD response to 12/16/19 [employer] message. Call to spouse. Advised patient and spouse read [employer] message and respond back with any questions. Spouse is very anxious and concerned about results. They have scheduled an appointment with [facility] to discuss results. Advised again for patient and spouse to read [employer] message and respond with questions. Advised Dr. Eric may be able to address may questions via [employer] and may be willing to see patient if there are more questions than what can be answered via [employer] and may not need to get another provider involved. Spouse agrees.

## 2019-12-17 NOTE — TELEPHONE ENCOUNTER
Patients wife calling back what the lab results mean. Wife most concerned about  Triglycerides 1,419      Wife had a hard time working today. Would like to talk with a triage nurse

## 2019-12-24 ENCOUNTER — APPOINTMENT (OUTPATIENT)
Dept: GENERAL RADIOLOGY | Facility: CLINIC | Age: 59
End: 2019-12-24
Attending: PHYSICIAN ASSISTANT
Payer: COMMERCIAL

## 2019-12-24 ENCOUNTER — HOSPITAL ENCOUNTER (EMERGENCY)
Facility: CLINIC | Age: 59
Discharge: HOME | End: 2019-12-24
Attending: PHYSICIAN ASSISTANT | Admitting: PHYSICIAN ASSISTANT
Payer: COMMERCIAL

## 2019-12-24 ENCOUNTER — NURSE TRIAGE (OUTPATIENT)
Dept: NURSING | Facility: CLINIC | Age: 59
End: 2019-12-24

## 2019-12-24 ENCOUNTER — TELEPHONE (OUTPATIENT)
Dept: INTERNAL MEDICINE | Facility: CLINIC | Age: 59
End: 2019-12-24

## 2019-12-24 VITALS
RESPIRATION RATE: 16 BRPM | SYSTOLIC BLOOD PRESSURE: 154 MMHG | HEART RATE: 94 BPM | DIASTOLIC BLOOD PRESSURE: 86 MMHG | BODY MASS INDEX: 32.88 KG/M2 | WEIGHT: 185.63 LBS | TEMPERATURE: 100.1 F | OXYGEN SATURATION: 95 %

## 2019-12-24 DIAGNOSIS — J11.1 INFLUENZA-LIKE ILLNESS: ICD-10-CM

## 2019-12-24 LAB
FLUAV+FLUBV AG SPEC QL: NEGATIVE
FLUAV+FLUBV AG SPEC QL: NEGATIVE
SPECIMEN SOURCE: NORMAL

## 2019-12-24 PROCEDURE — 71046 X-RAY EXAM CHEST 2 VIEWS: CPT

## 2019-12-24 PROCEDURE — 99285 EMERGENCY DEPT VISIT HI MDM: CPT

## 2019-12-24 PROCEDURE — 99285 EMERGENCY DEPT VISIT HI MDM: CPT | Mod: 25

## 2019-12-24 PROCEDURE — 87804 INFLUENZA ASSAY W/OPTIC: CPT | Performed by: PHYSICIAN ASSISTANT

## 2019-12-24 RX ORDER — OSELTAMIVIR PHOSPHATE 75 MG/1
75 CAPSULE ORAL 2 TIMES DAILY
Qty: 10 CAPSULE | Refills: 0 | Status: SHIPPED | OUTPATIENT
Start: 2019-12-24 | End: 2019-12-31

## 2019-12-24 ASSESSMENT — ENCOUNTER SYMPTOMS
VOMITING: 0
NAUSEA: 0
SORE THROAT: 1
ABDOMINAL PAIN: 1
HEADACHES: 0
DIARRHEA: 0
COUGH: 1
SHORTNESS OF BREATH: 0

## 2019-12-24 NOTE — ED PROVIDER NOTES
History     Chief Complaint:  Cough    HPI   Jeffrey Skaggs is a 59 year old male who presents with his wife to the ED for evaluation of cough. The patient reports he developed a productive cough with clear phlegm then fever yesterday. No hemoptysis. He has associated sore throat and abdominal pain from coughing. He has not received his flu shot yet. The patient's wife reports his highest temp was 102.4. The patient denies any headache, shortness of breath, chest pain, nausea, vomiting, or diarrhea.     Allergies:  No known drug allergies    Medications:    Aspirin 81mg  Fenofibrate  Glipizide    Metoprolol     Past Medical History:    HTN  CKD  Type 2 DM  Liver cirrhosis    Heart murmur    Past Surgical History:    Heart catheterization     Family History:    CAD, MI age 73: father  Diabetes: mother     Social History:  Smoking status: Never smoker    Substance use: No  Alcohol use: Yes  Presents to ED with wife    Marital Status:  Single [1]     Review of Systems   HENT: Positive for sore throat.    Respiratory: Positive for cough. Negative for shortness of breath.    Cardiovascular: Negative for chest pain.   Gastrointestinal: Positive for abdominal pain. Negative for diarrhea, nausea and vomiting.   Neurological: Negative for headaches.   All other systems reviewed and are negative.     Physical Exam     Patient Vitals for the past 24 hrs:   BP Temp Temp src Pulse Resp SpO2 Weight   12/24/19 1743 (!) 154/86 100.1  F (37.8  C) Oral 94 16 95 % 84.2 kg (185 lb 10 oz)     Physical Exam  Constitutional: well appearing, no acute distress.   Head: No external signs of trauma noted to head or face.   Eyes: Pupils are equal, round, and reactive to light. Conjunctiva normal.  ENT: MMM. Oropharynx is clear and moist without tonsillar enlargement or exudates. Normal voice.   Neck: No lymphadenopathy. No swelling, normal ROM.  Cardiovascular: Normal rate, regular rhythm, and intact distal pulses.    Respiratory: Effort  normal. No respiratory distress. Lungs clear to auscultation bilaterally.   GI: Soft. There is no tenderness.    Musculoskeletal: No deformities appreciated. Normal ROM. No edema noted.  Neurological: Alert and Oriented x 3. Speech normal. Moves all extremities equally.    Psychiatric: Appropriate mood, affect, and behavior.   Skin: Skin is warm and dry.       Emergency Department Course     Imaging:  Radiographic findings were communicated with the patient and family who voiced understanding of the findings.    XR Chest 2 Views  IMPRESSION: Negative chest. As read by Radiology.     Laboratory:  Laboratory findings were communicated with the patient and family who voiced understanding of the findings.    Influenza A/B antigen: Negative     Emergency Department Course:  Past medical records, nursing notes, and vitals reviewed.    1751: I performed an exam of the patient as documented above.     1800: Patient provided a flu swab.     The patient was sent for a chest x-ray while in the emergency department, results above.     1850: I rechecked the patient and discussed the results of his workup thus far.     Findings and plan explained to the patient and spouse. Patient discharged home with instructions regarding supportive care, medications, and reasons to return. The importance of close follow-up was reviewed. The patient was prescribed Tamiflu.     I personally reviewed the laboratory results with the patient and spouse and answered all related questions prior to discharge.     Impression & Plan      Medical Decision Making:  Jeffrey Skaggs is a 59 year old male who presents for evaluation of cough, fever and myalgias.  This is consistent with an influenza like illness. Influenza swab is negative, but symptoms are consistent with influenza. They are at risk for pneumonia but no signs of this are detected on CXR today.  He has no other associated symptoms to suggest other etiologies of his symptoms at this time. Given  this is likely influenza, we will plan to treat with Tamiflu. Close followup of primary care physician is indicated and return to the ED for high fevers > 103 for more than 48 hours more, increasing productive cough, shortness of breath, or confusion.  There is no signs of serious bacterial infection such as bacteremia, meningitis, UTI/pyelonephritis, strep pharyngitis, etc.        Diagnosis:    ICD-10-CM   1. Influenza-like illness R69     Disposition: Patient discharged to home with wife      Discharge Medications:  New Prescriptions    OSELTAMIVIR (TAMIFLU) 75 MG CAPSULE    Take 1 capsule (75 mg) by mouth 2 times daily for 5 days     Katy Barakat  12/24/2019   [facility] EMERGENCY DEPARTMENT    Scribe Disclosure:  I, Katy Barakat, am serving as a scribe at 5:51 PM on 12/24/2019 to document services personally performed by Oanh Smith PA-C based on my observations and the provider's statements to me.        Oanh Smith PA-C  12/24/19 7008

## 2019-12-24 NOTE — ED AVS SNAPSHOT
[facility] Emergency Department  [address]  [facility] [address]  Phone:  [phone]  Fax:  [phone]                                    Jeffrey Skaggs   MRN: [id number]    Department:  [facility] Emergency Department   Date of Visit:  12/24/2019           After Visit Summary Signature Page    I have received my discharge instructions, and my questions have been answered. I have discussed any challenges I see with this plan with the nurse or doctor.    ..........................................................................................................................................  Patient/Patient Representative Signature      ..........................................................................................................................................  Patient Representative Print Name and Relationship to Patient    ..................................................               ................................................  Date                                   Time    ..........................................................................................................................................  Reviewed by Signature/Title    ...................................................              ..............................................  Date                                               Time          22EPIC Rev 08/18

## 2019-12-24 NOTE — TELEPHONE ENCOUNTER
Patient wife calls to state that patient is having a cough for about 10 hours and is productive. Took temp and it is 101.4 oral. Is having sore stomach muscles are sore from cough and throat is dry.

## 2019-12-24 NOTE — TELEPHONE ENCOUNTER
Reporting onset this morning of fever and cough; current temp is 102 ; unableto takae  Acetaminophen or ibuprofen due to kidney an liver disease   Hasnot had flu shot   triage protocol reaviewed   advise to be seen within 4 hrs; may need togo to ED due to holiday   caller understands and will comply   Shalini Ritchie RN  FNA          Reason for Disposition    [1] Fever > 100.0 F (37.8 C) AND [2] diabetes mellitus or weak immune system (e.g., HIV positive, cancer chemo, splenectomy, chronic steroids)    Additional Information    Negative: Severe difficulty breathing (e.g., struggling for each breath, speaks in single words)    Negative: Bluish (or gray) lips or face now    Negative: Shock suspected (e.g., cold/pale/clammy skin, too weak to stand, low BP, rapid pulse)    Negative: Sounds like a life-threatening emergency to the triager    Negative: [1] Sounds like a cold AND [2] no fever    Negative: [1] Cough with sputum AND [2] no fever    Negative: [1] Dry cough (no sputum) sputum AND [2] no fever    Negative: [1] Throat pain AND [2] no fever    Negative: Influenza vaccine reaction is suspected    Negative: Chest pain  (Exception: MILD central chest pain, present only when coughing)    Negative: [1] Headache AND [2] stiff neck (can't touch chin to chest)    Negative: Fever > 104 F (40 C)    Negative: [1] Difficulty breathing AND [2] not severe AND [3] not from stuffy nose (e.g., not relieved by cleaning out the nose)    Negative: Patient sounds very sick or weak to the triager    Protocols used: INFLUENZA - SEASONAL-A-

## 2019-12-24 NOTE — ED TRIAGE NOTES
Fever started last night. Fever 102.4 at home. No ibuprofen or tylenol. Small amount of mucous being coughed up, no blood. No vomiting, diarrhea, abdominal pain or chest pain.

## 2019-12-25 NOTE — ED NOTES
Pt provided with discharge paperwork and educated on recommended follow-up with PCP. Pt educated on how to manage symptoms at home and how to take prescription medications appropriately.

## 2019-12-25 NOTE — TELEPHONE ENCOUNTER
Patient's wife calling to report patient was prescribed Tamiflu.today for an influenza- like illness. Patient was negative for Influenza A-B. Wife is concerned about being with family tomorrow since she has been around patient today. She doesn't want to infect anybody if she potentially was infected by the patient. Care advice reviewed about influenza. Advised it might be best if she stays away from people tomorrow as a person can spread the virus 1 day before symptoms develop. Caller verbalized understanding and had no further questions.     Jacinda Perry RN/[facility]    Additional Information    Influenza prevention, questions about    Influenza, questions about    Influenza and antiviral drugs, questions about    [1]Influenza EXPOSURE  (Close Contact) within last 7 days AND [2] NO respiratory symptoms    Protocols used: INFLUENZA EXPOSURE-A-AH

## 2019-12-26 DIAGNOSIS — Z71.89 OTHER SPECIFIED COUNSELING: ICD-10-CM

## 2019-12-26 NOTE — PROGRESS NOTES
Patient identified as high risk for readmission.  Patient meets criteria for care coordination outreach.    Belle Duron RN  Care Coordination  Phone:  [phone]  Email: [email or web address]

## 2019-12-27 ENCOUNTER — PATIENT OUTREACH (OUTPATIENT)
Dept: CARE COORDINATION | Facility: CLINIC | Age: 59
End: 2019-12-27

## 2019-12-27 NOTE — PROGRESS NOTES
CCC Referral: Attempt 1  Community Health Worker called and left a message for the patient in order to discuss enrollment with Clinic Care Coordination.    If the patient is returning my call, please transfer him to me, Robprincess, at [phone].    Per chart review  ED for cough, fever, myalgia    Order Details   Proc category: Referral Class: Local Print   Standing status: Normal Order status: Sent   Enc provider: Roberta Eric MD Enc department:  Care Coordination   Order date: 12/26/2019 Order user: Belle Duron RN   Visit type: CCC PHONE VISIT Appointment Window:     Comments   CHW to outreach  Referral made off of discharge report.   ______________________  Next Outreach: 12/30/19

## 2019-12-30 ENCOUNTER — PATIENT OUTREACH (OUTPATIENT)
Dept: CARE COORDINATION | Facility: CLINIC | Age: 59
End: 2019-12-30

## 2019-12-30 NOTE — PROGRESS NOTES
Community Health Worker called and left a message for the patient.  If the patient is returning my call, please transfer the patient to [facility] at ext. [phone].   Patient has been mailed a unreachable letter and was provided with CHW contact information if they are interested in accessing Clinic Care Coordination.  Order for Care Management has been closed, no further outreach will be done at this time and patient can be re-referred.

## 2019-12-30 NOTE — LETTER
Dear Luc,                                                                                 You were recently referred to the [facility]'s Clinic Care Coordination service.  This is a service offered through your Primary Care Clinic which can help you access resources, services in regard to your health and well-being goals. The clinic Community Health Worker has placed two calls to you to discuss the nature of this service and to offer enrollment.  If you are interested in learning more about Clinic Care Coordination, please call your Primary Care Clinic's Community Health Worker, Reji, at [phone].                                                            Sincerely,                                                                              BRAXTON Mendoza                                                                                          Clinic Care Coordination                                                  [facility]

## 2019-12-31 ENCOUNTER — OFFICE VISIT (OUTPATIENT)
Dept: INTERNAL MEDICINE | Facility: CLINIC | Age: 59
End: 2019-12-31
Payer: COMMERCIAL

## 2019-12-31 VITALS
SYSTOLIC BLOOD PRESSURE: 138 MMHG | HEART RATE: 71 BPM | OXYGEN SATURATION: 98 % | HEIGHT: 63 IN | WEIGHT: 185 LBS | BODY MASS INDEX: 32.78 KG/M2 | TEMPERATURE: 97.4 F | RESPIRATION RATE: 16 BRPM | DIASTOLIC BLOOD PRESSURE: 78 MMHG

## 2019-12-31 DIAGNOSIS — R68.89 FLU-LIKE SYMPTOMS: ICD-10-CM

## 2019-12-31 DIAGNOSIS — I10 ESSENTIAL HYPERTENSION, BENIGN: Primary | ICD-10-CM

## 2019-12-31 DIAGNOSIS — E11.22 TYPE 2 DIABETES MELLITUS WITH STAGE 2 CHRONIC KIDNEY DISEASE, WITHOUT LONG-TERM CURRENT USE OF INSULIN (H): ICD-10-CM

## 2019-12-31 DIAGNOSIS — N18.2 TYPE 2 DIABETES MELLITUS WITH STAGE 2 CHRONIC KIDNEY DISEASE, WITHOUT LONG-TERM CURRENT USE OF INSULIN (H): ICD-10-CM

## 2019-12-31 PROBLEM — J06.9 VIRAL UPPER RESPIRATORY TRACT INFECTION: Status: ACTIVE | Noted: 2019-12-31

## 2019-12-31 PROCEDURE — 99214 OFFICE O/P EST MOD 30 MIN: CPT | Performed by: NURSE PRACTITIONER

## 2019-12-31 ASSESSMENT — MIFFLIN-ST. JEOR: SCORE: 1549.28

## 2019-12-31 NOTE — PATIENT INSTRUCTIONS
Doing better so continue with rest and fluids    In 1-2 weeks when feeling healthy, return for flu shot.

## 2019-12-31 NOTE — NURSING NOTE
"Vital signs:  Temp: 97.4  F (36.3  C) Temp src: Oral BP: (!) 140/80 Pulse: 71   Resp: 16 SpO2: 98 %     Height: 160 cm (5' 3\") Weight: 83.9 kg (185 lb)  Estimated body mass index is 32.77 kg/m  as calculated from the following:    Height as of this encounter: 1.6 m (5' 3\").    Weight as of this encounter: 83.9 kg (185 lb).        "

## 2019-12-31 NOTE — PROGRESS NOTES
Subjective     Jeffrey Skaggs is a 59 year old male who presents to clinic today for the following health issues:    HPI   ED/UC Followup:    Facility:  [facility]  Date of visit: 19  Reason for visit: Influenza-like illness  Current Status: stable       Patient Active Problem List   Diagnosis     Essential hypertension, benign     Abnormal liver function tests     Type 2 diabetes mellitus with stage 2 chronic kidney disease (H)     CKD (chronic kidney disease) stage 3, GFR 30-59 ml/min (H)     Cirrhosis of liver (H)     Hyperlipidemia LDL goal <100     Viral upper respiratory tract infection     Flu-like symptoms     History reviewed. No pertinent surgical history.    Social History     Tobacco Use     Smoking status: Never Smoker     Smokeless tobacco: Never Used   Substance Use Topics     Alcohol use: Not Currently     Comment: stopped 2-3 weeks ago     Family History   Problem Relation Age of Onset     C.A.TRACY Father         first MI at age 73     Diabetes Mother      CKathyASHA. Paternal Grandfather         first MI in his 30s, from which he          Current Outpatient Medications   Medication Sig Dispense Refill     aspirin 81 MG tablet Take 1 tablet (81 mg) by mouth daily 30 tablet 11     fenofibrate (TRICOR) 145 MG tablet Take 1 tablet (145 mg) by mouth daily 30 tablet 1     glipiZIDE (GLUCOTROL XL) 10 MG 24 hr tablet Take 1 tablet (10 mg) by mouth daily 30 tablet 1     metoprolol succinate ER (TOPROL-XL) 100 MG 24 hr tablet Take 1 tablet (100 mg) by mouth daily 30 tablet 1     metoprolol succinate ER (TOPROL-XL) 50 MG 24 hr tablet 1/2 po daily x3 days, 1 po daily x5-7 days, then 2 po daily 60 tablet 0     ACCU-CHEK FASTCLIX LANCETS MISC 1 Device 2 times daily 100 each 6     ACE/ARB NOT PRESCRIBED, INTENTIONAL, 1 each continuous prn ACE & ARB not prescribed due to Worsening renal function on ACE/ARB therapy       Allergies   Allergen Reactions     Seasonal Allergies      BP Readings from Last 3 Encounters:  "  12/31/19 138/78   12/24/19 (!) 154/86   12/13/19 (!) 203/87    Wt Readings from Last 3 Encounters:   12/31/19 83.9 kg (185 lb)   12/24/19 84.2 kg (185 lb 10 oz)   12/13/19 85.5 kg (188 lb 9.6 oz)               Reviewed and updated as needed this visit by Provider  Tobacco  Med Hx  Surg Hx  Fam Hx  Soc Hx        Review of Systems   ROS COMP: Constitutional, HEENT, cardiovascular, pulmonary, gi and gu systems are negative, except as otherwise noted.      Objective    /78   Pulse 71   Temp 97.4  F (36.3  C) (Oral)   Resp 16   Ht 1.6 m (5' 3\")   Wt 83.9 kg (185 lb)   SpO2 98%   BMI 32.77 kg/m    Body mass index is 32.77 kg/m .     Physical Exam   GENERAL: healthy, alert and no distress  NECK: no adenopathy, no asymmetry, masses, or scars and thyroid normal to palpation  RESP: lungs clear to auscultation - no rales, rhonchi or wheezes  CV: regular rate and rhythm, normal S1 S2, no S3 or S4, no murmur, click or rub, no peripheral edema and peripheral pulses strong  ABDOMEN: soft, nontender, no hepatosplenomegaly, no masses and bowel sounds normal  MS: no gross musculoskeletal defects noted, no edema  SKIN: no suspicious lesions or rashes    Diagnostic Test Results:  Labs reviewed in Epic        Assessment & Plan     1. Flu-like symptoms  - slowly resolving after completing Tamiflu  - continue rest and fluids    2. Essential HTN, benign  - Today's BP recheck 138/78 and on medication    3. Type 2 DM  - Recent A1C = 8.8 (goal <8) and on medication  - encouraged to f/u with PCP         Patient Instructions   Doing better so continue with rest and fluids    In 1-2 weeks when feeling healthy, return for flu shot.      Return in about 2 months (around 2/29/2020).    Charlotte Estrada CNP  [facility]        "

## 2020-01-02 ENCOUNTER — TELEPHONE (OUTPATIENT)
Dept: INTERNAL MEDICINE | Facility: CLINIC | Age: 60
End: 2020-01-02

## 2020-01-02 NOTE — TELEPHONE ENCOUNTER
Left message for wife Betzaida to return call to a triage nurse.  We have a signed CTC.  SUKUMAR Bowles R.N.    
Patients spouse, Betzaida calls stating that patient has brusing on the abdomen about were the bra line would be on a woman.  Spouse is not certain how long it has been there but she saw it on evening of 12/31/19.  Spouse is not sure how long it has been there but it is huge and he has not injured himself recently.  Patient has had a bad cough.  Wife does not think he should wait to be seen until 01/13/20 by Dr. Eric and is asking if he can be fit in with PCP as soon as possible.  Please advise.  
Spoke with wife Betzaida, she states patient's cough started around 12/24/19 when he had flu, continues to have cough that varies in intensity.  Wife not sure if it is productive.  Betzaida states patient does not have fever, SOB, wheezing, chest pain.  Developed a big band of bruising below breasts and wrapping around sides.  First noticed 12/31/19, painful to touch.  No known injury and patient is not on blood thinners though does take daily ASA 81 mg.  Scheduled to see partner tomorrow 1/3/19 at 10:20 a.m., to go to UC/ER if worsening of symptoms before then.  SUKUMAR Bowles R.N.    
0 = swallows foods/liquids without difficulty

## 2020-01-03 ENCOUNTER — OFFICE VISIT (OUTPATIENT)
Dept: INTERNAL MEDICINE | Facility: CLINIC | Age: 60
End: 2020-01-03
Payer: COMMERCIAL

## 2020-01-03 VITALS
TEMPERATURE: 98.2 F | DIASTOLIC BLOOD PRESSURE: 81 MMHG | SYSTOLIC BLOOD PRESSURE: 138 MMHG | WEIGHT: 189 LBS | HEART RATE: 66 BPM | OXYGEN SATURATION: 96 % | RESPIRATION RATE: 16 BRPM | BODY MASS INDEX: 33.48 KG/M2

## 2020-01-03 DIAGNOSIS — S30.1XXA ABDOMINAL WALL HEMATOMA, INITIAL ENCOUNTER: ICD-10-CM

## 2020-01-03 DIAGNOSIS — N18.30 CKD (CHRONIC KIDNEY DISEASE) STAGE 3, GFR 30-59 ML/MIN (H): ICD-10-CM

## 2020-01-03 DIAGNOSIS — K74.60 CIRRHOSIS OF LIVER WITHOUT ASCITES, UNSPECIFIED HEPATIC CIRRHOSIS TYPE (H): ICD-10-CM

## 2020-01-03 DIAGNOSIS — N18.2 TYPE 2 DIABETES MELLITUS WITH STAGE 2 CHRONIC KIDNEY DISEASE, WITHOUT LONG-TERM CURRENT USE OF INSULIN (H): ICD-10-CM

## 2020-01-03 DIAGNOSIS — E11.22 TYPE 2 DIABETES MELLITUS WITH STAGE 2 CHRONIC KIDNEY DISEASE, WITHOUT LONG-TERM CURRENT USE OF INSULIN (H): ICD-10-CM

## 2020-01-03 DIAGNOSIS — J06.9 UPPER RESPIRATORY TRACT INFECTION, UNSPECIFIED TYPE: Primary | ICD-10-CM

## 2020-01-03 PROCEDURE — 99214 OFFICE O/P EST MOD 30 MIN: CPT | Performed by: INTERNAL MEDICINE

## 2020-01-03 NOTE — PROGRESS NOTES
ASSESSMENT & PLAN:                                                      59-year-old man with medical problems: Uncontrolled diabetes, CKD 3, unspecified cirrhosis, hypertension, with normal recent platelet number and no history of easy bruising or bleeding comes in today with improving URI and left abdominal wall hematoma.    I think the hematoma is related with her intense cough.  Since she does not have other signs of bleeding, says the hematoma is healing well.  He cannot wait to discuss with Dr. Roberta Eric about checking INR on his next appointment January 8    (J06.9) Upper respiratory tract infection, unspecified type  (primary encounter diagnosis)  Comment: Getting better  Plan: Observation    (S30.1XXA) Abdominal wall hematoma, initial encounter  Comment: Healing as expected  Plan: Observation    (K74.60) Cirrhosis of liver without ascites, unspecified hepatic cirrhosis type (H)  (E11.22,  N18.2) Type 2 diabetes mellitus with stage 2 chronic kidney disease, without long-term current use of insulin (H)  Comment: Not controlled   (N18.3) CKD (chronic kidney disease) stage 3, GFR 30-59 ml/min (H)  Plan: Follow-up appointment with Dr. Roberta Eric January 8       Chief Complaint:                                                      Cough  L thorax bruise      SUBJECTIVE:                                                    History of present illness     Cough  -- x 2 w, started on 12/24  -- getting better  -- fever resolved  -- dry cough  -- with the cough he had pain on the lower rib cage    L side thorax bruise  -- wife noticed bruise on the L chest and asked him to see a doctor, to make sure he does not have blood clots  -- he doesn't have usually bruises  -- no gum bleedings with brushing  -- exam: I think he had a hematoma above the belt and it spread horizontally above the belt. When the belt pressure was off the blood sipped down to the hip.   -- we talked about labsL PLT, INR He prefers to wait to see   [place]   -- PLT normal Dec 13  -- Hx of fatty liver      ROS:                                                      ROS: negative for fever, chills, cough, wheezes, chest pain, shortness of breath, vomiting, abdominal pain, leg swelling       OBJECTIVE:                                                    Physical Exam :    Blood pressure 138/81, pulse 66, temperature 98.2  F (36.8  C), resp. rate 16, weight 85.7 kg (189 lb), SpO2 96 %.   NAD, appears comfortable  Skin: no rashes   HEENT: PERRLA, EOMI, pink conjunctiva, anicteric sclerae, bilateral tympanic membranes are clinically normal, oropharynx is normal color  Neck: supple, no JVD, No thyroidmegaly. Lymph nodes nonpalpable cervical and supraclavicular.  Chest: clear to auscultation bilaterally, good respiratory effort  Heart: S1 S2, RRR, no mgr appreciated  Abdomen: soft, not tender, no hepatosplenomegaly or masses appreciated, no abdominal bruit, present bowel sounds.  Hematoma, as above  Extremities: no edema,   Neurologic: A, Ox3, no focal signs appreciated    PMHx: reviewed  Past Medical History:   Diagnosis Date     BENIGN HYPERTENSION 6/21/2004     CARDIOVASCULAR SCREENING; LDL GOAL LESS THAN 100 10/31/2010     CKD (chronic kidney disease) stage 3, GFR 30-59 ml/min (H) 2/5/2013     Liver function tests abnormal 5/12/2008     Type 2 diabetes, HbA1C goal < 8% (H) 10/31/2010      PSHx: reviewed  History reviewed. No pertinent surgical history.     Meds: reviewed  Current Outpatient Medications   Medication Sig Dispense Refill     ACCU-CHEK FASTCLIX LANCETS MISC 1 Device 2 times daily 100 each 6     ACE/ARB NOT PRESCRIBED, INTENTIONAL, 1 each continuous prn ACE & ARB not prescribed due to Worsening renal function on ACE/ARB therapy       aspirin 81 MG tablet Take 1 tablet (81 mg) by mouth daily 30 tablet 11     fenofibrate (TRICOR) 145 MG tablet Take 1 tablet (145 mg) by mouth daily 30 tablet 1     glipiZIDE (GLUCOTROL XL) 10 MG 24 hr tablet Take 1 tablet (10  mg) by mouth daily 30 tablet 1     metoprolol succinate ER (TOPROL-XL) 100 MG 24 hr tablet Take 1 tablet (100 mg) by mouth daily 30 tablet 1     metoprolol succinate ER (TOPROL-XL) 50 MG 24 hr tablet 1/2 po daily x3 days, 1 po daily x5-7 days, then 2 po daily 60 tablet 0       Soc Hx: reviewed  Fam Hx: reviewed          Jenna Cortez MD  Internal Medicine

## 2020-01-08 ENCOUNTER — OFFICE VISIT (OUTPATIENT)
Dept: SLEEP MEDICINE | Facility: CLINIC | Age: 60
End: 2020-01-08
Payer: COMMERCIAL

## 2020-01-08 ENCOUNTER — DOCUMENTATION ONLY (OUTPATIENT)
Dept: SLEEP MEDICINE | Facility: CLINIC | Age: 60
End: 2020-01-08

## 2020-01-08 DIAGNOSIS — G47.33 OSA (OBSTRUCTIVE SLEEP APNEA): Primary | ICD-10-CM

## 2020-01-08 DIAGNOSIS — G47.9 SLEEP DISTURBANCE: ICD-10-CM

## 2020-01-08 PROCEDURE — G0399 HOME SLEEP TEST/TYPE 3 PORTA: HCPCS

## 2020-01-09 ENCOUNTER — DOCUMENTATION ONLY (OUTPATIENT)
Dept: SLEEP MEDICINE | Facility: CLINIC | Age: 60
End: 2020-01-09
Payer: COMMERCIAL

## 2020-01-09 NOTE — PROGRESS NOTES
This HSAT was performed using a Noxturnal T3 device which recorded snore, sound, movement activity, body position, nasal pressure, oronasal thermal airflow, pulse, oximetry and both chest and abdominal respiratory effort. HSAT data was restricted to the time patient states they were in bed.     HSAT was scored using 1B 4% hypopnea rule.     AHI: 41.6. Snoring was reported as loud.  Time with SpO2 below 89% was 77 minutes.   Overall signal quality was good     Pt will follow up with sleep provider to determine appropriate therapy.     Ordering Jodi ELMORE Snigdhasmrithi Sagar, MD Charles O., BA, RPS, RST System Clinical Specialist 01/09/2020

## 2020-01-13 NOTE — PROCEDURES
"HOME SLEEP STUDY INTERPRETATION    Patient: Jeffrey Skaggs  MRN: [id number]  YOB: 1960  Study Date: 2020  Referring Provider: Roberta Eric  Ordering Provider: Rachelle Zapata MD     Indications for Home Study: Jeffrey Skaggs is a 59 year old male with a history of hypertension, diabetes mellitus type 2, cirrhosis of liver, CKD who complains of fatigue during most of the day for at least 9+ years.    He reports snoring, observed apneas during sleep and dry mouth.  Home sleep study was obtained to evaluate for possible obstructive sleep apnea.    Ht 1.6 m (5' 3\")   Wt 81.6 kg (180 lb)   BMI 31.9 kg/m    Garvin Sleepiness Scale:   STOP-BAN/8    Data: A full night home sleep study was performed recording the standard physiologic parameters including body position, movement, sound, nasal pressure, thermal oral airflow, chest and abdominal movements with respiratory inductance plethysmography, and oxygen saturation by pulse oximetry. Pulse rate was estimated by oximetry recording. This study was considered adequate based on > 4 hours of quality oximetry and respiratory recording. As specified by the AASM Manual for the Scoring of Sleep and Associated events, version 2.3, Rule VIII.D 1B, 4% oxygen desaturation scoring for hypopneas is used as a standard of care on all home sleep apnea testing.    Analysis Time: 433.7 minutes    Respiration:   Sleep Associated Hypoxemia: sustained hypoxemia was present. Baseline oxygen saturation was 92.1 %.  Time with saturation less than or equal to 88% was 77.0 minutes. The lowest oxygen saturation was 71 %.   Snoring: Snoring was present.  Respiratory events: The home study revealed a presence of 73 obstructive apneas and 7 mixed and central apneas. There were 221 hypopneas resulting in a combined apnea/hypopnea index [AHI] of 41.6 events per hour.  AHI was 55.5 per hour supine, N/A per hour prone, N/A per hour on left side, and 19.6 " per hour on right side.   Pattern: Excluding events noted above, respiratory rate and pattern was Normal.    Position: Percent of time spent: supine -61.4 %, prone -0 %, on left -0 %, on right -38.5 %.    Heart Rate: By pulse oximetry, the average rate was normal at 74 bpm.  The maximum rate was 95 and the minimum rate was 59 bpm.    Assessment:     Severe obstructive sleep apnea,  pronounced during supine sleep.    Sleep associated hypoxemia was present.    Recommendations:    Consider the following options for the treatment of the sleep-related breathing disorder:a) supervised polysomnography with all-night titration using positive airway pressure device to establish the optimum pressures to control the sleep-related breathing disorder or b) empirically initiating treatment with auto titrating CPAP with pressure settings 5-15 cmH2O with clinical follow-up including reviewing the compliance measures and a follow-up overnight oximetry with  CPAP to check for resolution of the hypoxemia.    Suggest optimizing sleep hygiene and avoiding sleep deprivation.    Weight management.    Diagnosis Code(s): Obstructive Sleep Apnea G47.33, Hypoxemia G47.36, Snoring R06.83    Rachelle Zapata MD, January 13, 2020   Diplomate, American Board of Internal Medicine, Sleep Medicine

## 2020-01-20 ENCOUNTER — TELEPHONE (OUTPATIENT)
Dept: INTERNAL MEDICINE | Facility: CLINIC | Age: 60
End: 2020-01-20

## 2020-01-20 DIAGNOSIS — E11.9 TYPE 2 DIABETES MELLITUS WITHOUT COMPLICATION, WITHOUT LONG-TERM CURRENT USE OF INSULIN (H): ICD-10-CM

## 2020-01-20 NOTE — TELEPHONE ENCOUNTER
[facility] Prior Authorization needed for Glipizide ER 10 MG ER Tablets      KEY: [id number]  LAST NAME: FRANCES  : 1960      [facility] # [id number] [phone]

## 2020-01-22 NOTE — TELEPHONE ENCOUNTER
Central Prior Authorization Team   Phone: [phone]    Prior Authorization Approval    Authorization Effective Date: 1/22/2020  Authorization Expiration Date: 1/21/2021  Medication: PA needed for glipizide  Approved Dose/Quantity:   Reference #: [id number]   Insurance Company: Other (see comments)Comment:  [employer]  Expected CoPay:       CoPay Card Available:      Foundation Assistance Needed:    Which Pharmacy is filling the prescription (Not needed for infusion/clinic administered): [facility] # [id number] - [place], [place] - [address] [address]   Pharmacy Notified: Yes  Patient Notified: Yes, **Instructed pharmacy to notify patient when script is ready to /ship.**

## 2020-01-22 NOTE — TELEPHONE ENCOUNTER
Central Prior Authorization Team   Phone: [phone]    PA Initiation    Medication: PA needed for glipizide  Insurance Company: Other (see comments)Comment:  [employer]  Pharmacy Filling the Rx: [facility] # [id number] - [place], [place] - [address]  Filling Pharmacy Phone: [phone]  Filling Pharmacy Fax: [phone]  Start Date: 1/22/2020

## 2020-01-28 ENCOUNTER — OFFICE VISIT (OUTPATIENT)
Dept: INTERNAL MEDICINE | Facility: CLINIC | Age: 60
End: 2020-01-28
Payer: COMMERCIAL

## 2020-01-28 VITALS
WEIGHT: 190.8 LBS | DIASTOLIC BLOOD PRESSURE: 76 MMHG | BODY MASS INDEX: 33.81 KG/M2 | TEMPERATURE: 97.5 F | HEART RATE: 72 BPM | RESPIRATION RATE: 14 BRPM | OXYGEN SATURATION: 99 % | SYSTOLIC BLOOD PRESSURE: 165 MMHG | HEIGHT: 63 IN

## 2020-01-28 DIAGNOSIS — I10 BENIGN ESSENTIAL HYPERTENSION: ICD-10-CM

## 2020-01-28 DIAGNOSIS — K74.60 CIRRHOSIS OF LIVER WITHOUT ASCITES, UNSPECIFIED HEPATIC CIRRHOSIS TYPE (H): ICD-10-CM

## 2020-01-28 DIAGNOSIS — E78.5 HYPERLIPIDEMIA LDL GOAL <100: ICD-10-CM

## 2020-01-28 DIAGNOSIS — L30.9 DERMATITIS: ICD-10-CM

## 2020-01-28 DIAGNOSIS — E78.1 HYPERTRIGLYCERIDEMIA: ICD-10-CM

## 2020-01-28 DIAGNOSIS — E11.9 TYPE 2 DIABETES MELLITUS WITHOUT COMPLICATION, WITHOUT LONG-TERM CURRENT USE OF INSULIN (H): Primary | ICD-10-CM

## 2020-01-28 PROCEDURE — 90471 IMMUNIZATION ADMIN: CPT | Performed by: INTERNAL MEDICINE

## 2020-01-28 PROCEDURE — 99207 C FOOT EXAM  NO CHARGE: CPT | Performed by: INTERNAL MEDICINE

## 2020-01-28 PROCEDURE — 90682 RIV4 VACC RECOMBINANT DNA IM: CPT | Performed by: INTERNAL MEDICINE

## 2020-01-28 PROCEDURE — 99215 OFFICE O/P EST HI 40 MIN: CPT | Mod: 25 | Performed by: INTERNAL MEDICINE

## 2020-01-28 RX ORDER — TRIAMCINOLONE ACETONIDE 1 MG/G
CREAM TOPICAL 2 TIMES DAILY
Qty: 45 G | Refills: 3 | Status: SHIPPED | OUTPATIENT
Start: 2020-01-28 | End: 2022-11-05

## 2020-01-28 RX ORDER — LANCETS
1 EACH MISCELLANEOUS DAILY
Qty: 100 EACH | Refills: 3 | Status: SHIPPED | OUTPATIENT
Start: 2020-01-28 | End: 2022-05-08

## 2020-01-28 ASSESSMENT — MIFFLIN-ST. JEOR: SCORE: 1575.59

## 2020-01-28 NOTE — NURSING NOTE
"BP (!) 165/76 (BP Location: Right arm, Patient Position: Sitting, Cuff Size: Adult Regular)   Pulse 72   Temp 97.5  F (36.4  C) (Oral)   Resp 14   Ht 1.6 m (5' 3\")   Wt 86.5 kg (190 lb 12.8 oz)   SpO2 99%   BMI 33.80 kg/m      "

## 2020-01-28 NOTE — PROGRESS NOTES
Subjective     Jeffrey Skaggs is a 59 year old male who presents to clinic today for the following health issues:    HPI   Diabetes Follow-up      How often are you checking your blood sugar? Not at all    What concerns do you have today about your diabetes? None     Do you have any of these symptoms? (Select all that apply)  No numbness or tingling in feet.  No redness, sores or blisters on feet.  No complaints of excessive thirst.  No reports of blurry vision.  No significant changes to weight.    Have you had a diabetic eye exam in the last 12 months? No    Hyperlipidemia Follow-Up      Are you regularly taking any medication or supplement to lower your cholesterol?   Yes, fenofibrate    Are you having muscle aches or other side effects that you think could be caused by your cholesterol lowering medication?  No    Hypertension Follow-up  He reports his blood pressure readings are running 130-140/70-85, average diastolics are 80.  This morning the systolic was 165 today, but few days ago it was down in the 135/80 range.    Do you check your blood pressure regularly outside of the clinic? Yes     Are you following a low salt diet? Yes    Are your blood pressures ever more than 140 on the top number (systolic) OR more   than 90 on the bottom number (diastolic), for example 140/90? Yes    BP Readings from Last 2 Encounters:   01/03/20 138/81   12/31/19 138/78     Hemoglobin A1C (%)   Date Value   12/13/2019 8.8 (H)   04/22/2014 5.7     LDL Cholesterol Calculated (mg/dL)   Date Value   12/13/2019     Cannot estimate LDL when triglyceride exceeds 400 mg/dL   04/22/2014 93     LDL Cholesterol Direct (mg/dL)   Date Value   12/13/2019 90         How many servings of fruits and vegetables do you eat daily?  2-3    On average, how many sweetened beverages do you drink each day (Examples: soda, juice, sweet tea, etc.  Do NOT count diet or artificially sweetened beverages)?   0    How many days per week do you exercise enough  to make your heart beat faster? 3 or less    How many minutes a day do you exercise enough to make your heart beat faster? 30 - 60    How many days per week do you miss taking your medication? 0          Other problems:   Cirrhosis of the liver: Reports no symptoms of abdominal pain, nausea, yellow skin, abdominal distention      Current concerns:   He has had issues with a lot of itchy skin, previously did use some cream for it would like a refill.    Patient Active Problem List   Diagnosis     Benign essential hypertension     Type 2 diabetes mellitus with stage 2 chronic kidney disease (H)     Cirrhosis of liver (H)     Hyperlipidemia LDL goal <100     Hypertriglyceridemia       Current Outpatient Medications   Medication Sig Dispense Refill     ACE/ARB NOT PRESCRIBED, INTENTIONAL, 1 each continuous prn ACE & ARB not prescribed due to Worsening renal function on ACE/ARB therapy       aspirin 81 MG tablet Take 1 tablet (81 mg) by mouth daily 30 tablet 11     blood glucose (IGLUCOSE TEST STRIPS) test strip Use to test blood sugar 1 times daily, give brand per patient 100 each 3     blood glucose monitoring (ACCU-CHEK FASTCLIX) lancets 1 each daily 100 each 3     fenofibrate (TRICOR) 145 MG tablet Take 1 tablet (145 mg) by mouth daily 90 tablet 1     glipiZIDE (GLUCOTROL XL) 10 MG 24 hr tablet Take 1 tablet (10 mg) by mouth daily 90 tablet 1     metoprolol succinate ER (TOPROL-XL) 100 MG 24 hr tablet Take 1 tablet (100 mg) by mouth daily 90 tablet 1     triamcinolone (KENALOG) 0.1 % external cream Apply topically 2 times daily 45 g 3       Social History     Tobacco Use     Smoking status: Never Smoker     Smokeless tobacco: Never Used   Substance Use Topics     Alcohol use: Not Currently     Comment: stopped 2-3 weeks ago     Drug use: No        ROS:  General: no fever, chills  Weight: stable  Vision:negative. Last eye exam more than a year.  ENT: negative  Respiratory negative.  Cardiac: no chest pain or  "pressure  Abdominal: no nausea, vomiting, abdominal pain, bowel changes  Vascular no complaints of claudication  Neurologic:no complaints of neuropathy  Feet no lesions, in grown nails, edema   : no polyuria, hematuria, dysuria    Objective:  Patient alert in NAD  BP (!) 165/76 (BP Location: Right arm, Patient Position: Sitting, Cuff Size: Adult Regular)   Pulse 72   Temp 97.5  F (36.4  C) (Oral)   Resp 14   Ht 1.6 m (5' 3\")   Wt 86.5 kg (190 lb 12.8 oz)   SpO2 99%   BMI 33.80 kg/m         Wt Readings from Last 4 Encounters:   01/28/20 86.5 kg (190 lb 12.8 oz)   01/03/20 85.7 kg (189 lb)   12/31/19 83.9 kg (185 lb)   12/24/19 84.2 kg (185 lb 10 oz)       CV: CV: normal S1, S2 without murmur, S3 or S4.  Carotid pulses: full  LUNGS: clear  Feet: pulses full, normal capillary refill  No lesions, sores or skin changes  Nails normal  Sensation able to feel fine filament    Lab Results   Component Value Date    A1C 8.8 12/13/2019    A1C 5.7 04/22/2014    A1C 5.1 04/05/2013    A1C 5.4 01/24/2013    A1C 5.1 01/19/2009       Lab Results   Component Value Date    CHOL 242 12/13/2019    HDL 30 12/13/2019    LDL  12/13/2019     Cannot estimate LDL when triglyceride exceeds 400 mg/dL    LDL 90 12/13/2019    TRIG 1,419 12/13/2019    CHOLHDLRATIO 3.8 04/22/2014       Assessment & Plan     1. Type 2 diabetes mellitus without complication, without long-term current use of insulin (H)   his hemoglobin A1c is increased significantly.  We discussed options, will start on glipizide, start to monitor sugars and send me some readings via my chart in 2 weeks.  May need to adjust the medication further at that time.  - glipiZIDE (GLUCOTROL XL) 10 MG 24 hr tablet; Take 1 tablet (10 mg) by mouth daily  Dispense: 90 tablet; Refill: 1  - FOOT EXAM    2. Cirrhosis of liver without ascites, unspecified hepatic cirrhosis type (H)  Stable liver function    3. Benign essential hypertension  Improving blood pressures, continue monitoring  - " metoprolol succinate ER (TOPROL-XL) 100 MG 24 hr tablet; Take 1 tablet (100 mg) by mouth daily  Dispense: 90 tablet; Refill: 1    4. Hyperlipidemia LDL goal <100  LDL is acceptable, consider low-dose statin in the future but work on his sugars triglycerides first    5. Hypertriglyceridemia  Work on diet, continue medication  - fenofibrate (TRICOR) 145 MG tablet; Take 1 tablet (145 mg) by mouth daily  Dispense: 90 tablet; Refill: 1    6. Dermatitis    - triamcinolone (KENALOG) 0.1 % external cream; Apply topically 2 times daily  Dispense: 45 g; Refill: 3           No follow-ups on file.    Roberta Eric MD  [facility]

## 2020-01-29 ENCOUNTER — DOCUMENTATION ONLY (OUTPATIENT)
Dept: SLEEP MEDICINE | Facility: CLINIC | Age: 60
End: 2020-01-29
Payer: COMMERCIAL

## 2020-01-29 NOTE — PROGRESS NOTES
Patient was offered choice of vendor and chose [facility].  Patient Jeffrey Skaggs was set up at [facility] on January 29, 2020. Patient received a Resmed AirSense 10 Auto. Pressures were set at 5-15 cm H2O.   Patient s ramp is 5 cm H2O for Auto and FLEX/EPR is A Flex.  Patient received a Resmed Mask name: [id number]  Nasal mask size Medium, heated tubing and heated humidifier.  Patient does need to meet compliance. Patient has a follow up on 04/06/20 with Dr. Zapata.    Kathryn Singletary

## 2020-02-01 ENCOUNTER — TRANSFERRED RECORDS (OUTPATIENT)
Dept: HEALTH INFORMATION MANAGEMENT | Facility: CLINIC | Age: 60
End: 2020-02-01

## 2020-02-01 LAB — RETINOPATHY: NORMAL

## 2020-02-03 ENCOUNTER — DOCUMENTATION ONLY (OUTPATIENT)
Dept: SLEEP MEDICINE | Facility: CLINIC | Age: 60
End: 2020-02-03

## 2020-02-03 NOTE — PROGRESS NOTES
3 DAY STM VISIT    Diagnostic AHI:   41.6  HST    Patient contacted for 3 day STM visit  Message left for patient to return call    Replacement device: No  STM ordered by provider: Yes     Device type: Auto-CPAP  PAP settings from order::  CPAP min 5 cm  H20       CPAP max 15 cm  H20        Mask type:    Nasal Mask     Device settings from machine      Min CPAP 5.0            Max CPAP 15.0            Assessment: Nightly usage over four hours.  Action plan: Patient to have 14 day STM visit. Patient has a follow up visit scheduled:   yes within 31-90 days of set up.    Total time spent on accessing, interpreting remote patient PAP therapy data, reviewing and and counseling with patient on therapy adaptation   0 minutes    [id number] no

## 2020-02-05 PROBLEM — R68.89 FLU-LIKE SYMPTOMS: Status: RESOLVED | Noted: 2019-12-31 | Resolved: 2020-02-05

## 2020-02-05 PROBLEM — J06.9 VIRAL UPPER RESPIRATORY TRACT INFECTION: Status: RESOLVED | Noted: 2019-12-31 | Resolved: 2020-02-05

## 2020-02-05 PROBLEM — E78.1 HYPERTRIGLYCERIDEMIA: Status: ACTIVE | Noted: 2020-02-05

## 2020-02-05 RX ORDER — GLIPIZIDE 10 MG/1
10 TABLET, FILM COATED, EXTENDED RELEASE ORAL DAILY
Qty: 90 TABLET | Refills: 1 | Status: SHIPPED | OUTPATIENT
Start: 2020-02-05 | End: 2020-10-07

## 2020-02-05 RX ORDER — FENOFIBRATE 145 MG/1
145 TABLET, COATED ORAL DAILY
Qty: 90 TABLET | Refills: 1 | Status: SHIPPED | OUTPATIENT
Start: 2020-02-05 | End: 2020-10-07

## 2020-02-05 RX ORDER — METOPROLOL SUCCINATE 100 MG/1
100 TABLET, EXTENDED RELEASE ORAL DAILY
Qty: 90 TABLET | Refills: 1 | Status: SHIPPED | OUTPATIENT
Start: 2020-02-05 | End: 2020-11-17

## 2020-02-09 ENCOUNTER — NURSE TRIAGE (OUTPATIENT)
Dept: NURSING | Facility: CLINIC | Age: 60
End: 2020-02-09

## 2020-02-10 NOTE — TELEPHONE ENCOUNTER
"Vomiting and diarrhea today, believes it's food poisoning. Has been resolving this evening but wondering how to rehydrate himself. Care advice reviewed, advised being seen within 3 days if not resolved.   Patient voiced understand and will follow disposition.   Kacy Landis RN  [facility] Nurse Advisor            Reason for Disposition    [1] MILD vomiting with diarrhea AND [2] present > 5 days    Additional Information    Negative: Shock suspected (e.g., cold/pale/clammy skin, too weak to stand, low BP, rapid pulse)    Negative: Difficult to awaken or acting confused (e.g., disoriented, slurred speech)    Negative: Sounds like a life-threatening emergency to the triager    Negative: Vomiting occurs only while coughing    Negative: [1] Pregnant < 20 Weeks AND [2] nausea/vomiting began in early pregnancy (i.e., 4-8 weeks pregnant)    Negative: Chest pain    Negative: Headache is main symptom    Negative: Vomiting (or Nausea) in a cancer patient who is currently (or recently) receiving chemotherapy or radiation therapy, or cancer patient who has metastatic or end-stage cancer and is receiving palliative care    Negative: [1] Vomiting AND [2] contains red blood or black (\"coffee ground\") material  (Exception: few red streaks in vomit that only happened once)    Negative: Severe pain in one eye    Negative: Recent head injury (within last 3 days)    Negative: Recent abdominal injury (within last 3 days)    Negative: [1] Insulin-dependent diabetes (Type I) AND [2] glucose > 400 mg/dl (22 mmol/l)    Negative: [1] SEVERE vomiting (e.g., 6 or more times/day) AND [2] present > 8 hours    Negative: [1] MODERATE vomiting (e.g., 3 - 5 times/day) AND [2] age > 60    Negative: Severe headache (e.g., excruciating) (Exception: similar to previous migraines)    Negative: [1] Drinking very little AND [2] dehydration suspected (e.g., no urine > 12 hours, very dry mouth, very lightheaded)    Negative: Patient sounds very sick or weak to the " triager    Negative: High-risk adult (e.g., diabetes mellitus, brain tumor, V-P shunt, hernia)    Negative: [1] Vomiting AND [2] abdomen looks much more swollen than usual    Negative: [1] Vomiting AND [2] contains bile (green color)    Negative: [1] Constant abdominal pain AND [2] present > 2 hours    Negative: [1] Fever > 103 F (39.4 C) AND [2] not able to get the fever down using Fever Care Advice    Negative: [1] Fever > 101 F (38.3 C) AND [2] age > 60    Negative: [1] Fever > 100.0 F (37.8 C) AND [2] bedridden (e.g., nursing home patient, CVA, chronic illness, recovering from surgery)    Negative: [1] Fever > 100.0 F (37.8 C) AND [2] weak immune system (e.g., HIV positive, cancer chemo, splenectomy, organ transplant, chronic steroids)    Negative: Taking any of the following medications: digoxin (Lanoxin), lithium, theophylline, phenytoin (Dilantin)    Negative: [1] MILD or MODERATE vomiting AND [2] present > 48 hours (2 days) (Exception: mild vomiting with associated diarrhea)    Negative: Fever present > 3 days (72 hours)    Negative: Vomiting a prescription medication    Protocols used: VOMITING-A-AH

## 2020-02-13 ENCOUNTER — DOCUMENTATION ONLY (OUTPATIENT)
Dept: SLEEP MEDICINE | Facility: CLINIC | Age: 60
End: 2020-02-13

## 2020-02-13 NOTE — PROGRESS NOTES
14  DAY STM VISIT    Diagnostic AHI:   41.6  HST    Message left for patient to return call     Assessment: Pt not meeting objective benchmarks for compliance       Action plan: waiting for patient to return call.  and pt to have 30 day STM visit.      Device type: Auto-CPAP    PAP settings: CPAP min 5.0 cm  H20       CPAP max 15.0 cm  H20           95th% pressure 9.6 cm  H20     Mask type:  Nasal Mask    Objective measures: 14 day rolling measures      Compliance  42 %      Leak  2.28 lpm  last  upload      AHI 2.27   last  upload      Average number of minutes 209      Objective measure goal  Compliance   Goal >70%  Leak   Goal < 24 lpm  AHI  Goal < 5  Usage  Goal >240        Total time spent on accessing and interpreting remote patient PAP therapy data  10 minutes    Total time spent counseling, coaching  and reviewing PAP therapy data with patient  0 minutes    10223  no  12077  no (3 day STM)

## 2020-03-02 ENCOUNTER — DOCUMENTATION ONLY (OUTPATIENT)
Dept: SLEEP MEDICINE | Facility: CLINIC | Age: 60
End: 2020-03-02

## 2020-03-02 NOTE — PROGRESS NOTES
30 DAY STM VISIT    Diagnostic AHI:   41.6  HST    Message left for patient to return call     Assessment: Pt not meeting objective benchmarks for compliance     Action plan: 2 week STM recheck appt scheduled  Patient has scheduled a follow up visit with Dr. Zapata on 4/6/20.   Device type: Auto-CPAP  PAP settings: CPAP min 5.0 cm  H20     CPAP max 15.0 cm  H20        95th% pressure 9.9 cm  H20   Mask type:  Nasal Mask  Objective measures: 14 day rolling measures      Compliance  14 %      Leak  3.09 lpm  last  upload      AHI 2.01   last  upload      Average number of minutes 58      Objective measure goal  Compliance   Goal >70%  Leak   Goal < 24 lpm  AHI  Goal < 5  Usage  Goal >240        Total time spent on accessing and interpreting remote patient PAP therapy data  12 minutes    Total time spent counseling, coaching  and reviewing PAP therapy data with patient  0 minutes     54276 no this call  [id number] no  at 3 or 14 day [facility]

## 2020-03-17 ENCOUNTER — DOCUMENTATION ONLY (OUTPATIENT)
Dept: SLEEP MEDICINE | Facility: CLINIC | Age: 60
End: 2020-03-17

## 2020-03-17 NOTE — PROGRESS NOTES
STM recheck     Diagnostic AHI:   41.6  HST    Data only recheck patient has not returned any STM visit calls in the past.     Assessment: Pt not meeting objective benchmarks for compliance       Action plan: follow up per provider request      Device type: Auto-CPAP    PAP settings: CPAP min 5.0 cm  H20       CPAP max 15.0 cm  H20         95th% pressure 9.4 cm  H20     Mask type:  Nasal Mask    Objective measures: 14 day rolling measures      Compliance  14 %      Leak  2.1 lpm  last  upload      AHI 3.63   last  upload      Average number of minutes 105      Objective measure goal  Compliance   Goal >70%  Leak   Goal < 24 lpm  AHI  Goal < 5  Usage  Goal >240        Total time spent on accessing and interpreting remote patient PAP therapy data  10 minutes    Total time spent counseling, coaching  and reviewing PAP therapy data with patient  0 minutes     )

## 2020-04-06 ENCOUNTER — VIRTUAL VISIT (OUTPATIENT)
Dept: SLEEP MEDICINE | Facility: CLINIC | Age: 60
End: 2020-04-06
Payer: COMMERCIAL

## 2020-04-06 VITALS — BODY MASS INDEX: 28.35 KG/M2 | WEIGHT: 160 LBS | HEIGHT: 63 IN

## 2020-04-06 DIAGNOSIS — G47.33 OSA ON CPAP: Primary | ICD-10-CM

## 2020-04-06 PROCEDURE — 99214 OFFICE O/P EST MOD 30 MIN: CPT | Mod: 95 | Performed by: INTERNAL MEDICINE

## 2020-04-06 ASSESSMENT — MIFFLIN-ST. JEOR: SCORE: 1435.76

## 2020-04-06 NOTE — PATIENT INSTRUCTIONS
Your BMI is Body mass index is 28.35 kg/m .  Weight management is a personal decision.  If you are interested in exploring weight loss strategies, the following discussion covers the approaches that may be successful. Body mass index (BMI) is one way to tell whether you are at a healthy weight, overweight, or obese. It measures your weight in relation to your height.  A BMI of 18.5 to 24.9 is in the healthy range. A person with a BMI of 25 to 29.9 is considered overweight, and someone with a BMI of 30 or greater is considered obese. More than two-thirds of American adults are considered overweight or obese.  Being overweight or obese increases the risk for further weight gain. Excess weight may lead to heart disease and diabetes.  Creating and following plans for healthy eating and physical activity may help you improve your health.  Weight control is part of healthy lifestyle and includes exercise, emotional health, and healthy eating habits. Careful eating habits lifelong are the mainstay of weight control. Though there are significant health benefits from weight loss, long-term weight loss with diet alone may be very difficult to achieve- studies show long-term success with dietary management in less than 10% of people. Attaining a healthy weight may be especially difficult to achieve in those with severe obesity. In some cases, medications, devices and surgical management might be considered.  What can you do?  If you are overweight or obese and are interested in methods for weight loss, you should discuss this with your provider.     Consider reducing daily calorie intake by 500 calories.     Keep a food journal.     Avoiding skipping meals, consider cutting portions instead.    Diet combined with exercise helps maintain muscle while optimizing fat loss. Strength training is particularly important for building and maintaining muscle mass. Exercise helps reduce stress, increase energy, and improves fitness.  Increasing exercise without diet control, however, may not burn enough calories to loose weight.       Start walking three days a week 10-20 minutes at a time    Work towards walking thirty minutes five days a week     Eventually, increase the speed of your walking for 1-2 minutes at time    In addition, we recommend that you review healthy lifestyles and methods for weight loss available through the National Institutes of Health patient information sites:  http://win.niddk.nih.gov/publications/index.htm    And look into health and wellness programs that may be available through your health insurance provider, employer, local community center, or stepan club.    Weight management plan: Patient was referred to their PCP to discuss a diet and exercise plan.

## 2020-04-06 NOTE — PROGRESS NOTES
"Jeffrey Skaggs is a 59 year old male who is being evaluated via a billable telephone visit.      The patient has been notified of following:     \"This telephone visit will be conducted via a call between you and your physician/provider. We have found that certain health care needs can be provided without the need for a physical exam.  This service lets us provide the care you need with a short phone conversation.  If a prescription is necessary we can send it directly to your pharmacy.  If lab work is needed we can place an order for that and you can then stop by our lab to have the test done at a later time.    If during the course of the call the physician/provider feels a telephone visit is not appropriate, you will not be charged for this service.\"     Patient has given verbal consent for Telephone visit?  Yes    Jeffrey Skaggs complains of    Chief Complaint   Patient presents with     CPAP Follow Up     fve       I have reviewed and updated the patient's Past Medical History, Social History, Family History and Medication List.    ALLERGIES  Seasonal allergies    Additional provider notes:   SLEEP CLINIC FOLLOW UP VISIT    Date of visit:4/6/2020    Chief complaint/purpose of visit: Follow-up of SANTY, review CPAP compliance measures    HPI: Jeffrey Skaggs is a 59 year old male with SANTY, history of hypertension, diabetes mellitus type 2, cirrhosis of liver, CKD. He  was diagnosed with severe SANTY during home sleep study obtained on 1/8/2020(AHI 41.6/hr).  He was set up at [facility] on January 29, 2020. Patient received a Resmed AirSense 10 Auto. Pressures were set at 5-15 cm H2O.   Patient s ramp is 5 cm H2O for Auto and FLEX/EPR is A Flex.  Patient received a Resmed Mask name: [id number]  Nasal mask size Medium, heated tubing and heated humidifier.  Patient does need to meet compliance  Telephone visit has been scheduled today for follow-up of SANTY and to review CPAP compliance measures.    He is currently being treated " with auto CPAP with pressure settings 5 to 15 cm water.  He reports that he has been needing to make a conscious effort to breathe and has difficulty breathing when he puts the mask on.  He has a nasal mask and reports that it works well.  He has not been able to use the device during the entire sleep duration.  With the CPAP there have not been any reports of snoring,but he  says that his wife is using CPAP too.  He reports that his sleep quality is better when he uses the CPAP for longer duration and feels rested upon awakening in the morning with improved energy levels.  and reduced daytime sleepiness.  He goes to bed around 10:30 PM and wakes up around 6 AM.  He naps occasionally for 15 minutes and that has been more common since he has been working from home.  He denies any concerns about drowsiness while driving.    CPAP compliance:  Auto-PAP 5.0 - 15.0 cmH2O 30 day usage data:    3% of days with > 4 hours of use. 25/30 days with no use.   Average use 28 minutes per day.   95%ile Leak 2.5 L/min.   CPAP 95% pressure 9.6 cm.   AHI 3.1 events per hour.     Sleep scales:  ESS:2  Insomnia severity index: 10    Previous sleep study report   HST results  Analysis Time: 433.7 minutes  Respiration:   Sleep Associated Hypoxemia: sustained hypoxemia was present. Baseline oxygen saturation was 92.1 %.  Time with saturation less than or equal to 88% was 77.0 minutes. The lowest oxygen saturation was 71 %.   Snoring: Snoring was present.  Respiratory events: The home study revealed a presence of 73 obstructive apneas and 7 mixed and central apneas. There were 221 hypopneas resulting in a combined apnea/hypopnea index [AHI] of 41.6 events per hour.  AHI was 55.5 per hour supine, N/A per hour prone, N/A per hour on left side, and 19.6 per hour on right side.   Pattern: Excluding events noted above, respiratory rate and pattern was Normal.     Position: Percent of time spent: supine -61.4 %, prone -0 %, on left -0 %, on right  -38.5 %.     Heart Rate: By pulse oximetry, the average rate was normal at 74 bpm.  The maximum rate was 95 and the minimum rate was 59 bpm.     Assessment:     Severe obstructive sleep apnea,  pronounced during supine sleep.    Sleep associated hypoxemia was present.      Current meds:  Current Outpatient Medications   Medication Sig Dispense Refill     aspirin 81 MG tablet Take 1 tablet (81 mg) by mouth daily 30 tablet 11     blood glucose (IGLUCOSE TEST STRIPS) test strip Use to test blood sugar 1 times daily, give brand per patient 100 each 3     blood glucose monitoring (ACCU-CHEK FASTCLIX) lancets 1 each daily 100 each 3     fenofibrate (TRICOR) 145 MG tablet Take 1 tablet (145 mg) by mouth daily 90 tablet 1     glipiZIDE (GLUCOTROL XL) 10 MG 24 hr tablet Take 1 tablet (10 mg) by mouth daily 90 tablet 1     metoprolol succinate ER (TOPROL-XL) 100 MG 24 hr tablet Take 1 tablet (100 mg) by mouth daily 90 tablet 1     triamcinolone (KENALOG) 0.1 % external cream Apply topically 2 times daily 45 g 3     Problem list:  Patient Active Problem List   Diagnosis     Benign essential hypertension     Type 2 diabetes mellitus with stage 2 chronic kidney disease (H)     Cirrhosis of liver (H)     Hyperlipidemia LDL goal <100     Hypertriglyceridemia     Past medical history, Past surgical history, Allergies, Social history, Family history: reviewed, per EMR    Assessment/Plan:  Severe SANTY: Patient reports poor CPAP compliance, but  reports positive benefits with the CPAP treatment including better sleep quality, improved energy levels and decreased daytime sleepiness.  He attributes the poor CPAP compliance to  needing to make a conscious effort to breathe and  difficulty breathing.  Based on the compliance measures, the 95th percentile pressure settings on his auto CPAP is 9.6 cm water.  Recommended revising the pressure settings on his auto CPAP to range between 8-15 cmH2O. Patient also feels that  a little increase in  "the pressure settings may be helpful.  DME orders were generated for revision of the pressure settings.  He will be followed through the [facility] program.  We discussed the need to meet the CPAP compliance.    We also discussed the consequences of untreated sleep apnea.  Recommended him to use the CPAP regularly during sleep and use it during the entire sleep duration to derive maximum benefit.    Plan is to review the compliance download in 1 month and if the compliance improves, recommend obtaining an overnight oximetry with the auto CPAP along with parallel compliance download to check for the resolution of hypoxemia that was observed during the home sleep study done in January 2020.  Orders were generated for the future overnight oximetry.  Results of the overnight oximetry will be communicated with the patient via [employer].    HTN: It was noted in January 2028 that he had elevated systolic blood pressure.  He is currently being treated with metoprolol (TOPROL- MG 24 hr once daily) and he reports good compliance with medication.  He has been monitoring the blood pressure at home and reports that the systolic BP has been in the range of 130 and the diastolic has been in the range of 70s.  Recommended him to continue monitoring the blood pressure and follow-up with the primary care provider for optimizing the management of hypertension.      Above ideal body weight: We discussed weight management with diet and exercise.    Patient was strongly advised to avoid driving, operating any heavy machinery or other hazardous situations while drowsy or sleepy.      Plan is to follow-up via telephone visit in 1 month to review CPAP compliance.    Phone call duration:22 minutes  Start time:3:30PM  End Time:3:52PM     The above note was dictated using voice recognition software. Although reviewed after completion, some word and grammatical error may remain . Please contact the author for any clarifications.    \"I spent a " "total of 22 minute with Jeffrey Skaggs during today's  telephone visit. Over 50% of this time was spent counseling the patient and  coordinating care regarding SANTY, CPAP treatment, going over PAP download/sleep study.\"        Rachelle Zapata MD  [facility]  [address] [id number] [phone]  Dept: [phone]              "

## 2020-04-09 ENCOUNTER — DOCUMENTATION ONLY (OUTPATIENT)
Dept: SLEEP MEDICINE | Facility: CLINIC | Age: 60
End: 2020-04-09

## 2020-05-13 ENCOUNTER — TELEPHONE (OUTPATIENT)
Dept: INTERNAL MEDICINE | Facility: CLINIC | Age: 60
End: 2020-05-13

## 2020-05-13 DIAGNOSIS — N18.2 TYPE 2 DIABETES MELLITUS WITH STAGE 2 CHRONIC KIDNEY DISEASE, WITHOUT LONG-TERM CURRENT USE OF INSULIN (H): Primary | ICD-10-CM

## 2020-05-13 DIAGNOSIS — E11.22 TYPE 2 DIABETES MELLITUS WITH STAGE 2 CHRONIC KIDNEY DISEASE, WITHOUT LONG-TERM CURRENT USE OF INSULIN (H): Primary | ICD-10-CM

## 2020-05-13 NOTE — LETTER
[facility]  [address], [address]  [place], [place]  [address]                                            TEL:[phone]  FAX:[phone]      May 20, 2020    Jeffrey Skaggs  [address] [address]          Dear Jeffrey Skaggs    We have been unable to reach you by phone.  Please call our office at [phone] between the hours of 8:00am and 5:00 pm for a message.      Sincerely,      [facility]

## 2020-05-13 NOTE — TELEPHONE ENCOUNTER
Please call the patient and ask him for recent blood sugars and advise he is due for diabetic lab and follow-up soon.  This may be a virtual visit.

## 2020-05-14 NOTE — TELEPHONE ENCOUNTER
Left message for patient to call back.    Amor Vaughn MA     ACS (acute coronary syndrome)  1/2015 - cath revealed 100% ostial stenosis not amenable to PCI - medical management  CAD (coronary artery disease)  s/p stents  CHF (congestive heart failure)  EF 40-45%  CVA (cerebral infarction)  with no residual, 8 yrs ago, prior to heart surgery - ST memory loss  Diabetes mellitus type 1  Insulin Dependent - Medtronic  Minimed Paradigm Insulin Pump - Novolog  DKA, type 1  1/2015  ESRD (end stage renal disease)  dialysis  M, tue, thursday, saturday  Gout  past  Hyperlipidemia    Peripheral vascular disease  occluded left fem-pop bypass 5/2015  Subclavian vein stenosis, left  s/p stent  TIA (transient ischemic attack)  x 2 - 8-9 years ago prior to ASD/VSD repair

## 2020-05-20 NOTE — TELEPHONE ENCOUNTER
[employer] message has not been read at time of this note. Letter mailed asking patient to call the clinic. This is the fourth attempt to reach patient. If no response, please close encounter.

## 2020-08-26 DIAGNOSIS — E11.22 TYPE 2 DIABETES MELLITUS WITH STAGE 2 CHRONIC KIDNEY DISEASE, WITHOUT LONG-TERM CURRENT USE OF INSULIN (H): ICD-10-CM

## 2020-08-26 DIAGNOSIS — N18.2 TYPE 2 DIABETES MELLITUS WITH STAGE 2 CHRONIC KIDNEY DISEASE, WITHOUT LONG-TERM CURRENT USE OF INSULIN (H): ICD-10-CM

## 2020-08-26 LAB — HBA1C MFR BLD: 7.1 % (ref 0–5.6)

## 2020-08-26 PROCEDURE — 80061 LIPID PANEL: CPT | Performed by: INTERNAL MEDICINE

## 2020-08-26 PROCEDURE — 80048 BASIC METABOLIC PNL TOTAL CA: CPT | Performed by: INTERNAL MEDICINE

## 2020-08-26 PROCEDURE — 82043 UR ALBUMIN QUANTITATIVE: CPT | Performed by: INTERNAL MEDICINE

## 2020-08-26 PROCEDURE — 83036 HEMOGLOBIN GLYCOSYLATED A1C: CPT | Performed by: INTERNAL MEDICINE

## 2020-08-26 PROCEDURE — 36415 COLL VENOUS BLD VENIPUNCTURE: CPT | Performed by: INTERNAL MEDICINE

## 2020-08-27 LAB
ANION GAP SERPL CALCULATED.3IONS-SCNC: 6 MMOL/L (ref 3–14)
BUN SERPL-MCNC: 38 MG/DL (ref 7–30)
CALCIUM SERPL-MCNC: 9.6 MG/DL (ref 8.5–10.1)
CHLORIDE SERPL-SCNC: 103 MMOL/L (ref 94–109)
CHOLEST SERPL-MCNC: 164 MG/DL
CO2 SERPL-SCNC: 23 MMOL/L (ref 20–32)
CREAT SERPL-MCNC: 2.03 MG/DL (ref 0.66–1.25)
CREAT UR-MCNC: 74 MG/DL
GFR SERPL CREATININE-BSD FRML MDRD: 35 ML/MIN/{1.73_M2}
GLUCOSE SERPL-MCNC: 171 MG/DL (ref 70–99)
HDLC SERPL-MCNC: 40 MG/DL
LDLC SERPL CALC-MCNC: 88 MG/DL
MICROALBUMIN UR-MCNC: 306 MG/L
MICROALBUMIN/CREAT UR: 412.4 MG/G CR (ref 0–17)
NONHDLC SERPL-MCNC: 124 MG/DL
POTASSIUM SERPL-SCNC: 4.8 MMOL/L (ref 3.4–5.3)
SODIUM SERPL-SCNC: 132 MMOL/L (ref 133–144)
TRIGL SERPL-MCNC: 181 MG/DL

## 2020-09-04 ENCOUNTER — OFFICE VISIT (OUTPATIENT)
Dept: INTERNAL MEDICINE | Facility: CLINIC | Age: 60
End: 2020-09-04
Payer: COMMERCIAL

## 2020-09-04 VITALS
WEIGHT: 196.7 LBS | BODY MASS INDEX: 34.85 KG/M2 | TEMPERATURE: 98 F | RESPIRATION RATE: 18 BRPM | SYSTOLIC BLOOD PRESSURE: 139 MMHG | DIASTOLIC BLOOD PRESSURE: 79 MMHG | OXYGEN SATURATION: 95 % | HEIGHT: 63 IN | HEART RATE: 67 BPM

## 2020-09-04 DIAGNOSIS — Z23 NEED FOR PROPHYLACTIC VACCINATION AND INOCULATION AGAINST INFLUENZA: ICD-10-CM

## 2020-09-04 DIAGNOSIS — E11.22 TYPE 2 DIABETES MELLITUS WITH STAGE 2 CHRONIC KIDNEY DISEASE, WITHOUT LONG-TERM CURRENT USE OF INSULIN (H): Primary | ICD-10-CM

## 2020-09-04 DIAGNOSIS — E78.1 HYPERTRIGLYCERIDEMIA: ICD-10-CM

## 2020-09-04 DIAGNOSIS — E78.5 HYPERLIPIDEMIA LDL GOAL <100: ICD-10-CM

## 2020-09-04 DIAGNOSIS — N18.2 TYPE 2 DIABETES MELLITUS WITH STAGE 2 CHRONIC KIDNEY DISEASE, WITHOUT LONG-TERM CURRENT USE OF INSULIN (H): Primary | ICD-10-CM

## 2020-09-04 DIAGNOSIS — K74.60 CIRRHOSIS OF LIVER WITHOUT ASCITES, UNSPECIFIED HEPATIC CIRRHOSIS TYPE (H): ICD-10-CM

## 2020-09-04 DIAGNOSIS — R94.4 ABNORMAL RENAL FUNCTION TEST: ICD-10-CM

## 2020-09-04 DIAGNOSIS — Z87.442 HISTORY OF KIDNEY STONES: ICD-10-CM

## 2020-09-04 DIAGNOSIS — I10 BENIGN ESSENTIAL HYPERTENSION: ICD-10-CM

## 2020-09-04 PROCEDURE — 99214 OFFICE O/P EST MOD 30 MIN: CPT | Mod: 25 | Performed by: INTERNAL MEDICINE

## 2020-09-04 PROCEDURE — 90471 IMMUNIZATION ADMIN: CPT | Performed by: INTERNAL MEDICINE

## 2020-09-04 PROCEDURE — 90686 IIV4 VACC NO PRSV 0.5 ML IM: CPT | Performed by: INTERNAL MEDICINE

## 2020-09-04 ASSESSMENT — MIFFLIN-ST. JEOR: SCORE: 1597.36

## 2020-09-04 NOTE — PROGRESS NOTES
Subjective     Jeffrey Skaggs is a 60 year old male who presents to clinic today for the following health issues:    HPI       Diabetes Follow-up    How often are you checking your blood sugar? One time daily  What time of day are you checking your blood sugars (select all that apply)?  Before meals  Have you had any blood sugars above 200?  Yes   Have you had any blood sugars below 70?  No    What symptoms do you notice when your blood sugar is low?  None    What concerns do you have today about your diabetes? None and Other: Eye issue     Do you have any of these symptoms? (Select all that apply)  No numbness or tingling in feet.  No redness, sores or blisters on feet.  No complaints of excessive thirst.  No reports of blurry vision.  No significant changes to weight.    Have you had a diabetic eye exam in the last 12 months? No      Hyperlipidemia Follow-Up  He started on fenofibrate for very high triglycerides and is tolerating this without any side effects.    Are you regularly taking any medication or supplement to lower your cholesterol?   Yes- fenofibrate     Are you having muscle aches or other side effects that you think could be caused by your cholesterol lowering medication?  No    Hypertension Follow-up      Do you check your blood pressure regularly outside of the clinic? Yes     Are you following a low salt diet? Yes    Are your blood pressures ever more than 140 on the top number (systolic) OR more   than 90 on the bottom number (diastolic), for example 140/90? Yes        How many servings of fruits and vegetables do you eat daily?  0-1    On average, how many sweetened beverages do you drink each day (Examples: soda, juice, sweet tea, etc.  Do NOT count diet or artificially sweetened beverages)?   0    How many days per week do you exercise enough to make your heart beat faster? 3 or less    How many minutes a day do you exercise enough to make your heart beat faster? 9 or less    How many days per  week do you miss taking your medication? 0      Other problems:  Cirrhosis of the liver: Stable labs.    Current concerns:   He reports 3 weeks ago or so he was having pain of the upper abdomen, left more than right, also was radiating into the flank area bilaterally.  It was bothering a lot more lying down, heat helped he has had other episodes like this before.,  Does have a history of having a kidney stone noted on CT in 2013.  He has had episodes of this type of pain intermittently in the past, usually lasting 3 to 4 days.  This episode lasted a week.  He assumes he probably was passing kidney stones.  His kidney function test done last week showed a significant increase in his creatinine.      Patient Active Problem List   Diagnosis     Benign essential hypertension     Type 2 diabetes mellitus with stage 2 chronic kidney disease (H)     Cirrhosis of liver (H)     Hyperlipidemia LDL goal <100     Hypertriglyceridemia       Current Outpatient Medications   Medication Sig Dispense Refill     aspirin 81 MG tablet Take 1 tablet (81 mg) by mouth daily 30 tablet 11     blood glucose (IGLUCOSE TEST STRIPS) test strip Use to test blood sugar 1 times daily, give brand per patient 100 each 3     blood glucose monitoring (ACCU-CHEK FASTCLIX) lancets 1 each daily 100 each 3     fenofibrate (TRICOR) 145 MG tablet Take 1 tablet (145 mg) by mouth daily 90 tablet 1     glipiZIDE (GLUCOTROL XL) 10 MG 24 hr tablet Take 1 tablet (10 mg) by mouth daily 90 tablet 1     metoprolol succinate ER (TOPROL-XL) 100 MG 24 hr tablet Take 1 tablet (100 mg) by mouth daily 90 tablet 1     triamcinolone (KENALOG) 0.1 % external cream Apply topically 2 times daily 45 g 3       Social History     Tobacco Use     Smoking status: Never Smoker     Smokeless tobacco: Never Used   Substance Use Topics     Alcohol use: Not Currently     Comment: stopped 2-3 weeks ago     Drug use: No        ROS:  General: no fever, chills  Weight:  "stable  Vision:negative. Last eye exam 2/20, nomal.  ENT: negative  Respiratory negative.  Cardiac: no chest pain or pressure  Abdominal: as above  Vascular no complaints of claudication  Neurologic:no complaints of neuropathy  Feet no lesions, in grown nails, edema   : no polyuria, hematuria, dysuria    Objective:  Patient alert in NAD  /79 (BP Location: Right arm, Patient Position: Sitting, Cuff Size: Adult Regular)   Pulse 67   Temp 98  F (36.7  C) (Oral)   Resp 18   Ht 1.6 m (5' 3\")   Wt 89.2 kg (196 lb 11.2 oz)   SpO2 95%   BMI 34.84 kg/m         Wt Readings from Last 4 Encounters:   09/04/20 89.2 kg (196 lb 11.2 oz)   04/06/20 72.6 kg (160 lb)   01/28/20 86.5 kg (190 lb 12.8 oz)   01/03/20 85.7 kg (189 lb)       CV: CV: normal S1, S2 without murmur, S3 or S4.  Carotid pulses: full  LUNGS: clear  Abdomen: Bowel sounds normal, soft, nontender. No hepatosplenomegaly. No masses.     Lab Results   Component Value Date    A1C 7.1 08/26/2020    A1C 8.8 12/13/2019    A1C 5.7 04/22/2014    A1C 5.1 04/05/2013    A1C 5.4 01/24/2013       Lab Results   Component Value Date    CHOL 164 08/26/2020    HDL 40 08/26/2020    LDL 88 08/26/2020    TRIG 181 08/26/2020    CHOLHDLRATIO 3.8 04/22/2014     Lab Results   Component Value Date    CR 2.03 08/26/2020    CR 0.92 12/13/2019    CR 1.40 05/24/2018    CR 1.10 11/18/2014    CR 1.40 11/03/2014         Assessment & Plan     Type 2 diabetes mellitus with stage 2 chronic kidney disease, without long-term current use of insulin (H)  Improved control, continue working on diet and exercise, continue medication    Cirrhosis of liver without ascites, unspecified hepatic cirrhosis type (H)  Stable    Benign essential hypertension  Well-controlled, continue medication    Hyperlipidemia LDL goal <100  Significant improvement in triglycerides, LDL is also improved mildly.  Will discuss statin next time    Hypertriglyceridemia  As above    Abnormal renal function test  He has " had a marked increase in his creatinine, advised about this test result, will recheck and order a renal ultrasound to look for obstruction..  It is possible that he had a kidney stone causing some temporary obstruction and so it may be improved.  If it is continuing to increase will need to do urgent evaluation and renal follow-up.  We will contact him with his results.       Return in about 6 months (around 3/4/2021).    Roberta Eric MD  [facility]

## 2020-09-04 NOTE — NURSING NOTE
"/79 (BP Location: Right arm, Patient Position: Sitting, Cuff Size: Adult Regular)   Pulse 67   Temp 98  F (36.7  C) (Oral)   Resp 18   Ht 1.6 m (5' 3\")   Wt 89.2 kg (196 lb 11.2 oz)   SpO2 95%   BMI 34.84 kg/m      "

## 2020-09-04 NOTE — Clinical Note
Please abstract the following data from this visit with this patient into the appropriate field in Epic:    Tests that can be patient reported without a hard copy:    Eye exam with ophthalmology on this date: 2/1/20

## 2020-09-08 DIAGNOSIS — R79.9 ABNORMAL BLOOD CHEMISTRY: ICD-10-CM

## 2020-09-08 PROCEDURE — 36415 COLL VENOUS BLD VENIPUNCTURE: CPT | Performed by: INTERNAL MEDICINE

## 2020-09-08 PROCEDURE — 82565 ASSAY OF CREATININE: CPT | Performed by: INTERNAL MEDICINE

## 2020-09-09 LAB
CREAT SERPL-MCNC: 1.06 MG/DL (ref 0.66–1.25)
GFR SERPL CREATININE-BSD FRML MDRD: 76 ML/MIN/{1.73_M2}

## 2020-09-11 ENCOUNTER — TELEPHONE (OUTPATIENT)
Dept: INTERNAL MEDICINE | Facility: CLINIC | Age: 60
End: 2020-09-11

## 2020-09-11 NOTE — TELEPHONE ENCOUNTER
Fax received from pharmacy asking MD to consider a statin be added for this patient per insurance recommendation with diabetes. Request states if evaluation determines this is not appropriate to please let pharmacy know to avoid duplicate requests. Patient does take Fenofibrate. Please advise.

## 2020-09-14 ENCOUNTER — HOSPITAL ENCOUNTER (OUTPATIENT)
Dept: ULTRASOUND IMAGING | Facility: CLINIC | Age: 60
Discharge: HOME | End: 2020-09-14
Attending: INTERNAL MEDICINE | Admitting: INTERNAL MEDICINE
Payer: COMMERCIAL

## 2020-09-14 DIAGNOSIS — Z87.442 HISTORY OF KIDNEY STONES: ICD-10-CM

## 2020-09-14 DIAGNOSIS — R94.4 ABNORMAL RENAL FUNCTION TEST: ICD-10-CM

## 2020-09-14 PROCEDURE — 76770 US EXAM ABDO BACK WALL COMP: CPT

## 2020-12-03 ENCOUNTER — TELEPHONE (OUTPATIENT)
Dept: INTERNAL MEDICINE | Facility: CLINIC | Age: 60
End: 2020-12-03

## 2021-01-15 ENCOUNTER — HEALTH MAINTENANCE LETTER (OUTPATIENT)
Age: 61
End: 2021-01-15

## 2021-02-24 ENCOUNTER — TRANSFERRED RECORDS (OUTPATIENT)
Dept: MULTI SPECIALTY CLINIC | Facility: CLINIC | Age: 61
End: 2021-02-24
Payer: COMMERCIAL

## 2021-02-24 LAB — RETINOPATHY: NORMAL

## 2021-03-14 ENCOUNTER — HEALTH MAINTENANCE LETTER (OUTPATIENT)
Age: 61
End: 2021-03-14

## 2021-10-24 ENCOUNTER — HEALTH MAINTENANCE LETTER (OUTPATIENT)
Age: 61
End: 2021-10-24

## 2021-11-23 DIAGNOSIS — I10 BENIGN ESSENTIAL HYPERTENSION: ICD-10-CM

## 2021-11-23 DIAGNOSIS — E11.9 TYPE 2 DIABETES MELLITUS WITHOUT COMPLICATION, WITHOUT LONG-TERM CURRENT USE OF INSULIN (H): ICD-10-CM

## 2021-11-23 DIAGNOSIS — E78.1 HYPERTRIGLYCERIDEMIA: ICD-10-CM

## 2021-11-29 NOTE — TELEPHONE ENCOUNTER
Overdue for diabetes lab an appointment, call and advise needs to be seen every 6 months at [facility], schedule lab and appointment, then I can do refill and orders

## 2021-11-29 NOTE — TELEPHONE ENCOUNTER
Called patient and lm on home voice mail to call clinic back.    Attempted to call work.  No extension.  Needed extension.

## 2021-12-03 NOTE — TELEPHONE ENCOUNTER
No call back or appointment scheduled.  Called patient and left message to schedule an appointment    Attempt #2

## 2021-12-07 RX ORDER — METOPROLOL SUCCINATE 100 MG/1
TABLET, EXTENDED RELEASE ORAL
Qty: 30 TABLET | Refills: 0 | Status: SHIPPED | OUTPATIENT
Start: 2021-12-07 | End: 2022-02-23

## 2021-12-07 RX ORDER — GLIPIZIDE 10 MG/1
TABLET, FILM COATED, EXTENDED RELEASE ORAL
Qty: 30 TABLET | Refills: 0 | Status: SHIPPED | OUTPATIENT
Start: 2021-12-07 | End: 2022-02-23

## 2021-12-07 RX ORDER — FENOFIBRATE 145 MG/1
TABLET, COATED ORAL
Qty: 30 TABLET | Refills: 0 | Status: SHIPPED | OUTPATIENT
Start: 2021-12-07 | End: 2022-02-23

## 2022-02-13 ENCOUNTER — HEALTH MAINTENANCE LETTER (OUTPATIENT)
Age: 62
End: 2022-02-13

## 2022-02-23 ENCOUNTER — OFFICE VISIT (OUTPATIENT)
Dept: PEDIATRICS | Facility: CLINIC | Age: 62
End: 2022-02-23
Payer: COMMERCIAL

## 2022-02-23 VITALS
DIASTOLIC BLOOD PRESSURE: 74 MMHG | OXYGEN SATURATION: 98 % | TEMPERATURE: 97.9 F | WEIGHT: 190 LBS | SYSTOLIC BLOOD PRESSURE: 138 MMHG | HEART RATE: 88 BPM | HEIGHT: 63 IN | BODY MASS INDEX: 33.66 KG/M2 | RESPIRATION RATE: 15 BRPM

## 2022-02-23 DIAGNOSIS — Z12.11 SCREEN FOR COLON CANCER: ICD-10-CM

## 2022-02-23 DIAGNOSIS — Z11.4 ENCOUNTER FOR SCREENING FOR HIV: ICD-10-CM

## 2022-02-23 DIAGNOSIS — E78.1 HYPERTRIGLYCERIDEMIA: ICD-10-CM

## 2022-02-23 DIAGNOSIS — I10 BENIGN ESSENTIAL HYPERTENSION: ICD-10-CM

## 2022-02-23 DIAGNOSIS — Z23 NEED FOR VACCINATION: ICD-10-CM

## 2022-02-23 DIAGNOSIS — E78.5 HYPERLIPIDEMIA LDL GOAL <100: ICD-10-CM

## 2022-02-23 DIAGNOSIS — R20.8 BURNING SENSATION OF FEET: ICD-10-CM

## 2022-02-23 DIAGNOSIS — L60.2 THICKENED NAILS: ICD-10-CM

## 2022-02-23 DIAGNOSIS — K74.60 CIRRHOSIS OF LIVER WITHOUT ASCITES, UNSPECIFIED HEPATIC CIRRHOSIS TYPE (H): ICD-10-CM

## 2022-02-23 DIAGNOSIS — E11.22 TYPE 2 DIABETES MELLITUS WITH STAGE 2 CHRONIC KIDNEY DISEASE, WITHOUT LONG-TERM CURRENT USE OF INSULIN (H): Primary | ICD-10-CM

## 2022-02-23 DIAGNOSIS — N18.2 TYPE 2 DIABETES MELLITUS WITH STAGE 2 CHRONIC KIDNEY DISEASE, WITHOUT LONG-TERM CURRENT USE OF INSULIN (H): Primary | ICD-10-CM

## 2022-02-23 LAB — HBA1C MFR BLD: 7.6 % (ref 0–5.6)

## 2022-02-23 PROCEDURE — 80061 LIPID PANEL: CPT | Performed by: NURSE PRACTITIONER

## 2022-02-23 PROCEDURE — 83036 HEMOGLOBIN GLYCOSYLATED A1C: CPT | Performed by: NURSE PRACTITIONER

## 2022-02-23 PROCEDURE — 80053 COMPREHEN METABOLIC PANEL: CPT | Performed by: NURSE PRACTITIONER

## 2022-02-23 PROCEDURE — 99207 PR FOOT EXAM NO CHARGE: CPT | Mod: 25 | Performed by: NURSE PRACTITIONER

## 2022-02-23 PROCEDURE — 90715 TDAP VACCINE 7 YRS/> IM: CPT | Performed by: NURSE PRACTITIONER

## 2022-02-23 PROCEDURE — 82043 UR ALBUMIN QUANTITATIVE: CPT | Performed by: NURSE PRACTITIONER

## 2022-02-23 PROCEDURE — 36415 COLL VENOUS BLD VENIPUNCTURE: CPT | Performed by: NURSE PRACTITIONER

## 2022-02-23 PROCEDURE — 90471 IMMUNIZATION ADMIN: CPT | Performed by: NURSE PRACTITIONER

## 2022-02-23 PROCEDURE — 87389 HIV-1 AG W/HIV-1&-2 AB AG IA: CPT | Performed by: NURSE PRACTITIONER

## 2022-02-23 PROCEDURE — 99214 OFFICE O/P EST MOD 30 MIN: CPT | Mod: 25 | Performed by: NURSE PRACTITIONER

## 2022-02-23 RX ORDER — METOPROLOL SUCCINATE 100 MG/1
100 TABLET, EXTENDED RELEASE ORAL DAILY
Qty: 90 TABLET | Refills: 0 | Status: SHIPPED | OUTPATIENT
Start: 2022-02-23 | End: 2022-04-01

## 2022-02-23 RX ORDER — GLIPIZIDE 10 MG/1
10 TABLET, FILM COATED, EXTENDED RELEASE ORAL DAILY
Qty: 90 TABLET | Refills: 0 | Status: SHIPPED | OUTPATIENT
Start: 2022-02-23 | End: 2022-04-01

## 2022-02-23 RX ORDER — FENOFIBRATE 145 MG/1
145 TABLET, COATED ORAL DAILY
Qty: 90 TABLET | Refills: 0 | Status: SHIPPED | OUTPATIENT
Start: 2022-02-23 | End: 2022-04-01

## 2022-02-23 NOTE — PATIENT INSTRUCTIONS
It was nice seeing you today.    Please let me know if you have any questions regarding today's visit!    Take care,    PHANI Dacosta DNP  Family Medicine

## 2022-02-23 NOTE — PROGRESS NOTES
Assessment & Plan     Type 2 diabetes mellitus with stage 2 chronic kidney disease, without long-term current use of insulin (H)  Labs done today.  Medication refilled. Foot exam performed--will refer to podiatry due to burning sensation and routine DM foot care.  Has been out of medication >6 months.  Encouraged patient to check FBS daily.  Follow-up with PCP.  Discussed possible referral to [facility].  - Hemoglobin A1c  - Comprehensive metabolic panel (BMP + Alb, Alk Phos, ALT, AST, Total. Bili, TP)  - Lipid Profile (Chol, Trig, HDL, LDL calc)  - Albumin Random Urine Quantitative with Creat Ratio  - FOOT EXAM  - glipiZIDE (GLUCOTROL XL) 10 MG 24 hr tablet; Take 1 tablet (10 mg) by mouth daily    Screen for colon cancer  - Adult Gastro Ref - Procedure Only; Future    Cirrhosis of liver without ascites, unspecified hepatic cirrhosis type (H)  Labs needed today.  - Comprehensive metabolic panel (BMP + Alb, Alk Phos, ALT, AST, Total. Bili, TP)    Hyperlipidemia LDL goal <100  Labs done today.  Has been out of medication for >6 months.  Has make dietary changes at home.  Medication refilled  - Lipid Profile (Chol, Trig, HDL, LDL calc)    Hypertriglyceridemia  - Lipid Profile (Chol, Trig, HDL, LDL calc)  - fenofibrate (TRICOR) 145 MG tablet; Take 1 tablet (145 mg) by mouth daily    Benign essential hypertension  Has been out of medication for >6 months.  Refilled today. Labs done today.  - Comprehensive metabolic panel (BMP + Alb, Alk Phos, ALT, AST, Total. Bili, TP)  - Albumin Random Urine Quantitative with Creat Ratio  - metoprolol succinate ER (TOPROL-XL) 100 MG 24 hr tablet; Take 1 tablet (100 mg) by mouth daily    Need for vaccination  - TDAP VACCINE (Adacel, Boostrix)  [[id number]]    Encounter for screening for HIV  - HIV Antigen Antibody Combo    Thickened nails  - Orthopedic  Referral; Future    Burning sensation of feet  - Orthopedic  Referral; Future  }     BMI:   Estimated body mass index is 33.66  "kg/m  as calculated from the following:    Height as of this encounter: 1.6 m (5' 3\").    Weight as of this encounter: 86.2 kg (190 lb).   Weight management plan: Discussed healthy diet and exercise guidelines      Referred back to PCP for further management.  Will communicate results on [employer].  Return if symptoms worsen or fail to improve.    Oanh Dacosta NP  [employer] SANG Calle is a 61 year old who presents for the following health issues:     History of Present Illness       CKD: He uses over the counter pain medication, including 81mg asprin, one time daily.    Diabetes:   He presents for follow up of diabetes.  He is checking home blood glucose a few times a month. He checks blood glucose before meals.  Blood glucose is sometimes over 200 and never under 70. When his blood glucose is low, the patient is asymptomatic for confusion, blurred vision, lethargy and reports not feeling dizzy, shaky, or weak.  He is concerned about blood sugar frequently over 200.  He is having excessive thirst, blurry vision and weight gain. The patient has had a diabetic eye exam in the last 12 months. Eye exam performed on 2021. Location of last eye exam [facility].        Hyperlipidemia:  He presents for follow up of hyperlipidemia.  He is not taking medication to lower cholesterol. He is not having myalgia or other side effects to statin medications.    Hypertension: He presents for follow up of hypertension.  He does check blood pressure  regularly outside of the clinic. Outside blood pressures have been over 140/90. He follows a low salt diet.   Reason for visit:  Follow up and renew prescriptions  Symptom onset:  More than a month  Symptoms include:  From last visit  Symptom intensity:  Mild  Symptom progression:  Staying the same  Had these symptoms before:  Yes  Has tried/received treatment for these symptoms:  Yes  Previous treatment was successful:  Yes  Prior treatment " description:  Medications  What makes it worse:  No  What makes it better:  Staying hydrated    He eats 4 or more servings of fruits and vegetables daily.He consumes 1 sweetened beverage(s) daily.He exercises with enough effort to increase his heart rate 20 to 29 minutes per day.  He exercises with enough effort to increase his heart rate 4 days per week. He is missing 7 dose(s) of medications per week.  He is not taking prescribed medications regularly due to other.     Patient has a significant health history. PCP at [facility] but here for medication refill.    Diabetes:  -FBS is always over 200 when patient checks.  Is experiencing blurry vision, weight gain, and excessive thirst.  -Last eye exam 2020.  Has an exam on Saturday at [facility]  -Has not had Glipizide for over 6 months.  When he was taking his medication, it was still over 200.  -Patient was on Metformin in the past discontinued due to cirrhosis of liver  -Has testing supplies at home.  -Needs refill today  -Needs DM foot exam done.  Complains of occasional burning of feet.      Cirrhosis:  -Needs labs today.  Seems to be compensated.  Last labs 2019.      CKD:  -Last GFR 35 and Cr 2.03 in 8/2020.      High Cholesterol:  -Has been out of fenofibrate for over 6 months.      Hypertension:  -Takes metoprolol.  Has been off medication for over 6 months.  -Needs refill today.  Has been previously on amlodipine (discontinued due to medication changes in 2008), lisinopril (stopped in 2013 due to increasing Cr.), and spironolactone for cirrhosis which was discontinued due to abnormal kidney function.      Diet and exercise  -Treadmill at home.  Uses frequently.  -Has modified exercise      Review of Systems   Constitutional, HEENT, cardiovascular, pulmonary, gi and gu systems are negative, except as otherwise noted.      Objective    /74 (BP Location: Right arm, Patient Position: Sitting, Cuff Size: Adult Regular)   Pulse 88   Temp 97.9  F (36.6  " C) (Tympanic)   Resp 15   Ht 1.6 m (5' 3\")   Wt 86.2 kg (190 lb)   SpO2 98%   BMI 33.66 kg/m    Body mass index is 33.66 kg/m .       Physical Exam   GENERAL: healthy, alert and no distress  RESP: lungs clear to auscultation - no rales, rhonchi or wheezes  CV: regular rate and rhythm, normal S1 S2, no S3 or S4, no murmur, click or rub, no peripheral edema and peripheral pulses strong  PSYCH: mentation appears normal, affect normal/bright  Diabetic foot exam: normal DP and PT pulses, no trophic changes or ulcerative lesions, normal sensory exam, normal monofilament exam, dry cracking heels and onychomycosis        "

## 2022-02-24 LAB
ALBUMIN SERPL-MCNC: 3.2 G/DL (ref 3.4–5)
ALP SERPL-CCNC: 396 U/L (ref 40–150)
ALT SERPL W P-5'-P-CCNC: 40 U/L (ref 0–70)
ANION GAP SERPL CALCULATED.3IONS-SCNC: 10 MMOL/L (ref 3–14)
AST SERPL W P-5'-P-CCNC: 73 U/L (ref 0–45)
BILIRUB SERPL-MCNC: 1.2 MG/DL (ref 0.2–1.3)
BUN SERPL-MCNC: 10 MG/DL (ref 7–30)
CALCIUM SERPL-MCNC: 8.8 MG/DL (ref 8.5–10.1)
CHLORIDE BLD-SCNC: 105 MMOL/L (ref 94–109)
CHOLEST SERPL-MCNC: 180 MG/DL
CO2 SERPL-SCNC: 21 MMOL/L (ref 20–32)
CREAT SERPL-MCNC: 0.72 MG/DL (ref 0.66–1.25)
CREAT UR-MCNC: 217 MG/DL
FASTING STATUS PATIENT QL REPORTED: YES
GFR SERPL CREATININE-BSD FRML MDRD: >90 ML/MIN/1.73M2
GLUCOSE BLD-MCNC: 193 MG/DL (ref 70–99)
HDLC SERPL-MCNC: 37 MG/DL
HIV 1+2 AB+HIV1 P24 AG SERPL QL IA: NONREACTIVE
LDLC SERPL CALC-MCNC: 81 MG/DL
MICROALBUMIN UR-MCNC: 5440 MG/L
MICROALBUMIN/CREAT UR: 2506.91 MG/G CR (ref 0–17)
NONHDLC SERPL-MCNC: 143 MG/DL
POTASSIUM BLD-SCNC: 4.1 MMOL/L (ref 3.4–5.3)
PROT SERPL-MCNC: 7 G/DL (ref 6.8–8.8)
SODIUM SERPL-SCNC: 136 MMOL/L (ref 133–144)
TRIGL SERPL-MCNC: 310 MG/DL

## 2022-03-30 ENCOUNTER — TELEPHONE (OUTPATIENT)
Dept: INTERNAL MEDICINE | Facility: CLINIC | Age: 62
End: 2022-03-30
Payer: COMMERCIAL

## 2022-03-30 NOTE — TELEPHONE ENCOUNTER
Pts wife also sent a message through HER [employer]. Sent her a message back that she cannot use her [employer] for his concerns.     This was her message:  Subject:Jeffrey Skaggs    Tuan,  I spoke with someone at the  regarding my  Jeffrey Skaggs. I am not sure if it is the meds he is taking is causing excessive tiredness. This is a ongoing issue that happens every day. It is getting very frustrating to have him get any work done in our home. He stays up late or not and sleeps most of the day. So I am wondering if its the 3 meds he is currently taking or if there are other issues with him.    Please respond back via [employer].   Thank you,  Betzaida Skaggs    Pt needs an OV. Last OV on 9/4/2020.   Call to Betzaida and discussed. CTC on file. She states he is just so tired, he cannot finish anything. He doesn't get short of breath that she notices, just isn't able to do anything. Has to go lie down. She isn't sure if he is depressed. He had Cirrhosis with ascites in the past where he had to get drained. She denies that he looks yellow or is swollen. She started crying on the phone and states she is frustrated that he doesn't help himself.   She states she is going to buy a BP cuff and check his BP and Pulse. Advised to write down number for doctor. She agrees.     Scheduled Video Visit for Friday per her request since they live in [place], it is difficult to get to the Clinic.

## 2022-03-30 NOTE — TELEPHONE ENCOUNTER
Patients wife Betzaida calling on C2C  She wants to know if the medications her  is on would me him lazy. She states he lays around the house all day. Please advise. Ok to call and pete [phone]

## 2022-04-01 ENCOUNTER — VIRTUAL VISIT (OUTPATIENT)
Dept: INTERNAL MEDICINE | Facility: CLINIC | Age: 62
End: 2022-04-01
Payer: COMMERCIAL

## 2022-04-01 DIAGNOSIS — N18.2 TYPE 2 DIABETES MELLITUS WITH STAGE 2 CHRONIC KIDNEY DISEASE, WITHOUT LONG-TERM CURRENT USE OF INSULIN (H): ICD-10-CM

## 2022-04-01 DIAGNOSIS — E11.22 TYPE 2 DIABETES MELLITUS WITH STAGE 2 CHRONIC KIDNEY DISEASE, WITHOUT LONG-TERM CURRENT USE OF INSULIN (H): ICD-10-CM

## 2022-04-01 DIAGNOSIS — R79.89 ELEVATED LFTS: ICD-10-CM

## 2022-04-01 DIAGNOSIS — R53.83 OTHER FATIGUE: Primary | ICD-10-CM

## 2022-04-01 DIAGNOSIS — K74.60 CIRRHOSIS OF LIVER WITHOUT ASCITES, UNSPECIFIED HEPATIC CIRRHOSIS TYPE (H): ICD-10-CM

## 2022-04-01 DIAGNOSIS — I10 BENIGN ESSENTIAL HYPERTENSION: ICD-10-CM

## 2022-04-01 DIAGNOSIS — E78.1 HYPERTRIGLYCERIDEMIA: ICD-10-CM

## 2022-04-01 DIAGNOSIS — R80.9 MICROALBUMINURIA: ICD-10-CM

## 2022-04-01 LAB
BASOPHILS # BLD AUTO: 0.1 10E3/UL (ref 0–0.2)
BASOPHILS NFR BLD AUTO: 1 %
EOSINOPHIL # BLD AUTO: 0.2 10E3/UL (ref 0–0.7)
EOSINOPHIL NFR BLD AUTO: 2 %
ERYTHROCYTE [DISTWIDTH] IN BLOOD BY AUTOMATED COUNT: 13 % (ref 10–15)
HCT VFR BLD AUTO: 41.3 % (ref 40–53)
HGB BLD-MCNC: 14.2 G/DL (ref 13.3–17.7)
LYMPHOCYTES # BLD AUTO: 1.8 10E3/UL (ref 0.8–5.3)
LYMPHOCYTES NFR BLD AUTO: 21 %
MCH RBC QN AUTO: 32.6 PG (ref 26.5–33)
MCHC RBC AUTO-ENTMCNC: 34.4 G/DL (ref 31.5–36.5)
MCV RBC AUTO: 95 FL (ref 78–100)
MONOCYTES # BLD AUTO: 0.9 10E3/UL (ref 0–1.3)
MONOCYTES NFR BLD AUTO: 10 %
NEUTROPHILS # BLD AUTO: 5.8 10E3/UL (ref 1.6–8.3)
NEUTROPHILS NFR BLD AUTO: 66 %
PLATELET # BLD AUTO: 175 10E3/UL (ref 150–450)
RBC # BLD AUTO: 4.36 10E6/UL (ref 4.4–5.9)
WBC # BLD AUTO: 8.8 10E3/UL (ref 4–11)

## 2022-04-01 PROCEDURE — 84439 ASSAY OF FREE THYROXINE: CPT | Performed by: NURSE PRACTITIONER

## 2022-04-01 PROCEDURE — 99215 OFFICE O/P EST HI 40 MIN: CPT | Mod: 95 | Performed by: NURSE PRACTITIONER

## 2022-04-01 PROCEDURE — 82306 VITAMIN D 25 HYDROXY: CPT | Performed by: NURSE PRACTITIONER

## 2022-04-01 PROCEDURE — 80050 GENERAL HEALTH PANEL: CPT | Performed by: NURSE PRACTITIONER

## 2022-04-01 PROCEDURE — 36415 COLL VENOUS BLD VENIPUNCTURE: CPT | Performed by: NURSE PRACTITIONER

## 2022-04-01 PROCEDURE — 82043 UR ALBUMIN QUANTITATIVE: CPT | Performed by: NURSE PRACTITIONER

## 2022-04-01 RX ORDER — FENOFIBRATE 145 MG/1
145 TABLET, COATED ORAL DAILY
Qty: 90 TABLET | Refills: 1 | Status: SHIPPED | OUTPATIENT
Start: 2022-04-01 | End: 2022-11-14

## 2022-04-01 RX ORDER — GLIPIZIDE 10 MG/1
10 TABLET, FILM COATED, EXTENDED RELEASE ORAL DAILY
Qty: 90 TABLET | Refills: 1 | Status: SHIPPED | OUTPATIENT
Start: 2022-04-01 | End: 2022-11-05

## 2022-04-01 RX ORDER — METOPROLOL SUCCINATE 100 MG/1
100 TABLET, EXTENDED RELEASE ORAL DAILY
Qty: 90 TABLET | Refills: 1 | Status: SHIPPED | OUTPATIENT
Start: 2022-04-01 | End: 2022-11-05

## 2022-04-01 NOTE — PATIENT INSTRUCTIONS
Lab     We may consider adding lisinopril back to your regimen if micro albumin still high      5/6/2022 810 am Dr Eric appointment

## 2022-04-01 NOTE — NURSING NOTE
"Chief Complaint   Patient presents with     Fatigue     pt is sleeping quite a bit     initial There were no vitals taken for this visit. Estimated body mass index is 33.66 kg/m  as calculated from the following:    Height as of 2/23/22: 1.6 m (5' 3\").    Weight as of 2/23/22: 86.2 kg (190 lb)..  bp completed using cuff size NA (Not Taken)  DOMINIC REILLY LPN  "

## 2022-04-01 NOTE — Clinical Note
"Hi  \"Saw\" this patient but was a telephone visit as they could not connect on video.fatigue- was placed back on his medication 2/2022 by another provider and lab done. He says he feels better on medication. Wife says he does nothing but sleep or rest. Microalbumin high, but not as high as 2 years ago. He was on lisinopril til 2013- potassium 8 at the time and it was stopped- he was in bad shape at that time.  Not sure if ACE/ARB ok to restart - kidney function and electrolytes look ok. I am repeating lab as alk phos quite high and recheck microalbum as well as thyroid and CBC and vit d. He has a follow up we scheduled 5/6 with you.   Just SHELLY Henson       "

## 2022-04-01 NOTE — PROGRESS NOTES
Luc is a 61 year old who is being evaluated via a billable video visit.      How would you like to obtain your AVS? MyChart  If the video visit is dropped, the invitation should be resent by: Text to cell phone: [phone]  Will anyone else be joining your video visit? Betzaida his wife    Video Start Time: 10:55 AM  Called and wanted to do by phone as they could not connect         Assessment & Plan     Other fatigue  Check lab   Is feeling better on his medication   Not checking glucose or blood pressure  at home   Is on metoprolol but does not feel fatigue worse on medication     Type 2 diabetes mellitus with stage 2 chronic kidney disease, without long-term current use of insulin (H)  Not checking glucose   a1c 7.6  Restarted on medication 2/2022  Glucose prior to medication was around 200     Benign essential hypertension  Not checking at home   Was ok at visit when blood pressure medication restarted     Elevated LFTs  Recheck     Microalbuminuria  Recheck   May consider nephrology referral     Hypertriglyceridemia  On medication     Cirrhosis of liver without ascites, unspecified hepatic cirrhosis type (H)  Not seeing GI   If number worsen could consider again         60 minutes spent on the date of the encounter doing chart review, history and exam, documentation and further activities per the note       Patient Instructions   Lab     We may consider adding lisinopril back to your regimen if micro albumin still high      5/6/2022 810 am Dr Eric appointment       Return in about 5 weeks (around 5/6/2022) for bp diabetes followup .    YANDY Villasenor CNP  [facility]    Rafael Calle is a 61 year old who presents for the following health issues     HPI     Fatigue     His microalbumin was high on lab     His liver functions worse than previous   Saw liver specialist in past     A1C 7.6  Glucose over 200 at home before medication start     Restarted on medication 2/23/2022  for blood pressure and cholesterol lowering medication   Feeling better on medication     Wife states he does nothing at home - feels something is not ok  I did ask if he was similar to this prior to start of his medication 2/2022 and he said he was similar and feels better on medication      Has not taken blood pressure at home     Feels like his mood is doing ok    Review of Systems   Constitutional, HEENT, cardiovascular, pulmonary, GI, , musculoskeletal, neuro, skin, endocrine and psych systems are negative, except as otherwise noted.      Objective           Vitals:  No vitals were obtained today due to virtual visit.    Physical Exam   GENERAL:  alert and no distress- wife commenting through visit     RESP: No audible wheeze, cough  PSYCH: Mentation appears normal    Lab             Video-Visit Details    Type of service:  Video Visit    Video End Time:11:41 AM    Originating Location (pt. Location): Home    Distant Location (provider location):  [facility]     Platform used for Video Visit: Eddy

## 2022-04-03 LAB
ALBUMIN SERPL-MCNC: 3.8 G/DL (ref 3.4–5)
ALP SERPL-CCNC: 189 U/L (ref 40–150)
ALT SERPL W P-5'-P-CCNC: 29 U/L (ref 0–70)
ANION GAP SERPL CALCULATED.3IONS-SCNC: 5 MMOL/L (ref 3–14)
AST SERPL W P-5'-P-CCNC: 65 U/L (ref 0–45)
BILIRUB SERPL-MCNC: 0.7 MG/DL (ref 0.2–1.3)
BUN SERPL-MCNC: 24 MG/DL (ref 7–30)
CALCIUM SERPL-MCNC: 10.1 MG/DL (ref 8.5–10.1)
CHLORIDE BLD-SCNC: 100 MMOL/L (ref 94–109)
CO2 SERPL-SCNC: 24 MMOL/L (ref 20–32)
CREAT SERPL-MCNC: 1.14 MG/DL (ref 0.66–1.25)
CREAT UR-MCNC: 36 MG/DL
DEPRECATED CALCIDIOL+CALCIFEROL SERPL-MC: 7 UG/L (ref 20–75)
GFR SERPL CREATININE-BSD FRML MDRD: 73 ML/MIN/1.73M2
GLUCOSE BLD-MCNC: 146 MG/DL (ref 70–99)
MICROALBUMIN UR-MCNC: 461 MG/L
MICROALBUMIN/CREAT UR: 1280.56 MG/G CR (ref 0–17)
POTASSIUM BLD-SCNC: 5.1 MMOL/L (ref 3.4–5.3)
PROT SERPL-MCNC: 8.1 G/DL (ref 6.8–8.8)
SODIUM SERPL-SCNC: 129 MMOL/L (ref 133–144)
T4 FREE SERPL-MCNC: 0.98 NG/DL (ref 0.76–1.46)
TSH SERPL DL<=0.005 MIU/L-ACNC: 6.64 MU/L (ref 0.4–4)

## 2022-04-04 DIAGNOSIS — R79.89 LOW SERUM SODIUM: ICD-10-CM

## 2022-04-04 DIAGNOSIS — E55.9 VITAMIN D DEFICIENCY: Primary | ICD-10-CM

## 2022-04-04 RX ORDER — ERGOCALCIFEROL 1.25 MG/1
50000 CAPSULE, LIQUID FILLED ORAL WEEKLY
Qty: 8 CAPSULE | Refills: 0 | Status: SHIPPED | OUTPATIENT
Start: 2022-04-04 | End: 2022-05-06

## 2022-04-04 RX ORDER — CHOLECALCIFEROL (VITAMIN D3) 50 MCG
2 TABLET ORAL DAILY
Qty: 200 TABLET | Refills: 3 | Status: SHIPPED | OUTPATIENT
Start: 2022-04-04 | End: 2022-05-06

## 2022-05-06 ENCOUNTER — OFFICE VISIT (OUTPATIENT)
Dept: INTERNAL MEDICINE | Facility: CLINIC | Age: 62
End: 2022-05-06
Payer: COMMERCIAL

## 2022-05-06 DIAGNOSIS — E11.22 TYPE 2 DIABETES MELLITUS WITH STAGE 2 CHRONIC KIDNEY DISEASE, WITHOUT LONG-TERM CURRENT USE OF INSULIN (H): Primary | ICD-10-CM

## 2022-05-06 DIAGNOSIS — G47.33 OSA (OBSTRUCTIVE SLEEP APNEA): ICD-10-CM

## 2022-05-06 DIAGNOSIS — R94.6 ABNORMAL FINDING ON THYROID FUNCTION TEST: ICD-10-CM

## 2022-05-06 DIAGNOSIS — R53.83 FATIGUE, UNSPECIFIED TYPE: ICD-10-CM

## 2022-05-06 DIAGNOSIS — M79.671 PAIN OF BOTH HEELS: ICD-10-CM

## 2022-05-06 DIAGNOSIS — N18.2 TYPE 2 DIABETES MELLITUS WITH STAGE 2 CHRONIC KIDNEY DISEASE, WITHOUT LONG-TERM CURRENT USE OF INSULIN (H): Primary | ICD-10-CM

## 2022-05-06 DIAGNOSIS — E55.9 VITAMIN D DEFICIENCY: ICD-10-CM

## 2022-05-06 DIAGNOSIS — M79.672 PAIN OF BOTH HEELS: ICD-10-CM

## 2022-05-06 LAB — HBA1C MFR BLD: 6.2 % (ref 0–5.6)

## 2022-05-06 PROCEDURE — 82306 VITAMIN D 25 HYDROXY: CPT | Performed by: INTERNAL MEDICINE

## 2022-05-06 PROCEDURE — 84439 ASSAY OF FREE THYROXINE: CPT | Performed by: INTERNAL MEDICINE

## 2022-05-06 PROCEDURE — 84443 ASSAY THYROID STIM HORMONE: CPT | Performed by: INTERNAL MEDICINE

## 2022-05-06 PROCEDURE — 99213 OFFICE O/P EST LOW 20 MIN: CPT | Performed by: INTERNAL MEDICINE

## 2022-05-06 PROCEDURE — 83036 HEMOGLOBIN GLYCOSYLATED A1C: CPT | Performed by: INTERNAL MEDICINE

## 2022-05-06 PROCEDURE — 36415 COLL VENOUS BLD VENIPUNCTURE: CPT | Performed by: INTERNAL MEDICINE

## 2022-05-06 NOTE — NURSING NOTE
"BP (!) 173/73 (BP Location: Right arm, Patient Position: Sitting, Cuff Size: Adult Large)   Pulse 78   Temp 98.3  F (36.8  C) (Oral)   Resp 16   Ht 1.6 m (5' 3\")   Wt 89.5 kg (197 lb 4.8 oz)   SpO2 94%   BMI 34.95 kg/m      "

## 2022-05-06 NOTE — PROGRESS NOTES
Assessment & Plan     Type 2 diabetes mellitus with stage 2 chronic kidney disease, without long-term current use of insulin (H)  Recommend rechecking his A1c since being back on his medication shows, especially since we are going to do some other lab tests.  - Hemoglobin A1c  - blood glucose (IGLUCOSE TEST STRIPS) test strip; Use to test blood sugar 1 times daily, give brand per patient  - blood glucose monitoring (ACCU-CHEK FASTCLIX) lancets; 1 each daily    Fatigue, unspecified type  This could be multifactorial, sleep apnea untreated is probably the major factor.  He had had elevated TSH so it is possible that he has been developing hypothyroidism.  Suspect vitamin D is not related.    SANTY (obstructive sleep apnea)  Given order for new CPAP, he can speak with the supply service to see whether his old machine could be appropriately cleaned, follow-up with sleep clinic  - CPAP Order for DME - ONLY FOR DME    Abnormal finding on thyroid function test  Recheck lab  - TSH with free T4 reflex  - T4 free    Vitamin D deficiency  Recheck levels and then I can advise on dose  - Vitamin D Deficiency    Pain of both heels  Not consistent with Planter fasciitis, recommend referral to podiatry.  - Orthopedic  Referral; Future          Return in about 6 months (around 11/6/2022).    Roberta Eric MD  [facility] is a 61 year old who presents for the following health issues     Diabetes Follow-up  He had not seen me in over 1.5 years.  He had stopped all his medications.  He saw another provider in February and got restarted on meds.  His sugars are doing better.  He needs a refill of lancets and test strips.  He was going to have his eye check done but had to reschedule it and will try to get that set up soon.  How often are you checking your blood sugar? A few times a month  What time of day are you checking your blood sugars (select all that apply)?  Before meals  Have  you had any blood sugars above 200?  Yes   Have you had any blood sugars below 70?  No    What symptoms do you notice when your blood sugar is low?  None    What concerns do you have today about your diabetes? None and Blood sugar is often over 200     Do you have any of these symptoms? (Select all that apply)  Numbness in feet    Have you had a diabetic eye exam in the last 12 months? No      Hyperlipidemia Follow-Up      Are you regularly taking any medication or supplement to lower your cholesterol?   Yes- fenofibrate     Are you having muscle aches or other side effects that you think could be caused by your cholesterol lowering medication?  No    Hypertension Follow-up  He reports his blood pressures are less than 140/70s    Do you check your blood pressure regularly outside of the clinic? Yes     Are you following a low salt diet? Yes    Are your blood pressures ever more than 140 on the top number (systolic) OR more   than 90 on the bottom number (diastolic), for example 140/90? No      Other concerns:  Fatigue: He had seen a colleague 1 month ago for ongoing fatigue.  He has known sleep apnea and has not been using his CPAP during the COVID pandemic.  When he pulled it out to use it he found there was a lot of mold through the hoses and is concerned about reusing the machine.  He had a number of labs done including a slightly elevated TSH with normal T4.  He has not had other symptoms of hypothyroidism other than the fatigue.  He was told to take some vitamin D but did not  the prescription, is not sure how much he was supposed to take.    Heel pain: He reports pain on the bottom of his heels.  He has episodes that come and go, usually last 7 to 10 days.  The current episode is been a couple weeks.  The pain will be present constantly, does not change through the day or worse first thing in the morning.        History of Present Illness       CKD: He uses over the counter pain medication, including  81mg asprin, one time daily.    Diabetes:   He presents for follow up of diabetes.  He is checking home blood glucose a few times a month. He checks blood glucose before meals.  Blood glucose is sometimes over 200 and never under 70. When his blood glucose is low, the patient is asymptomatic for confusion, blurred vision, lethargy and reports not feeling dizzy, shaky, or weak.  He is concerned about blood sugar frequently over 200.  He is having burning in feet. The patient has not had a diabetic eye exam in the last 12 months.         Hyperlipidemia:  He presents for follow up of hyperlipidemia.  He is taking medication to lower cholesterol. He is not having myalgia or other side effects to statin medications.    Hypertension: He presents for follow up of hypertension.  He does check blood pressure  regularly outside of the clinic. Outpatient blood pressures have not been over 140/90. He follows a low salt diet.     He eats 4 or more servings of fruits and vegetables daily.He consumes 1 sweetened beverage(s) daily.He exercises with enough effort to increase his heart rate 20 to 29 minutes per day.  He exercises with enough effort to increase his heart rate 3 or less days per week.   He is taking medications regularly.         Patient Active Problem List   Diagnosis     Benign essential hypertension     Type 2 diabetes mellitus with stage 2 chronic kidney disease (H)     Cirrhosis of liver (H)     Hyperlipidemia LDL goal <100     Hypertriglyceridemia       Current Outpatient Medications   Medication Sig Dispense Refill     aspirin 81 MG tablet Take 1 tablet (81 mg) by mouth daily 30 tablet 11     blood glucose (IGLUCOSE TEST STRIPS) test strip Use to test blood sugar 1 times daily, give brand per patient 100 strip 3     blood glucose monitoring (ACCU-CHEK FASTCLIX) lancets 1 each daily 100 each 3     fenofibrate (TRICOR) 145 MG tablet Take 1 tablet (145 mg) by mouth daily 90 tablet 1     glipiZIDE (GLUCOTROL XL) 10  "MG 24 hr tablet Take 1 tablet (10 mg) by mouth daily 90 tablet 1     metoprolol succinate ER (TOPROL-XL) 100 MG 24 hr tablet Take 1 tablet (100 mg) by mouth daily 90 tablet 1     triamcinolone (KENALOG) 0.1 % external cream Apply topically 2 times daily 45 g 3       Social History     Tobacco Use     Smoking status: Never Smoker     Smokeless tobacco: Never Used   Substance Use Topics     Alcohol use: Not Currently     Comment: stopped 2-3 weeks ago     Drug use: No        ROS:  General: no fever, chills  Respiratory negative.  Cardiac: no chest pain or pressure  Abdominal: no nausea, vomiting, abdominal pain, bowel changes  Vascular no complaints of claudication  Neurologic:no complaints of neuropathy      Objective:  Patient alert in NAD  BP (!) 173/73 (BP Location: Right arm, Patient Position: Sitting, Cuff Size: Adult Large)   Pulse 78   Temp 98.3  F (36.8  C) (Oral)   Resp 16   Ht 1.6 m (5' 3\")   Wt 89.5 kg (197 lb 4.8 oz)   SpO2 94%   BMI 34.95 kg/m         Wt Readings from Last 4 Encounters:   05/06/22 89.5 kg (197 lb 4.8 oz)   02/23/22 86.2 kg (190 lb)   09/04/20 89.2 kg (196 lb 11.2 oz)   04/06/20 72.6 kg (160 lb)     Not examined.     Lab Results   Component Value Date    A1C 7.6 02/23/2022    A1C 7.1 08/26/2020    A1C 8.8 12/13/2019    A1C 5.7 04/22/2014    A1C 5.1 04/05/2013    A1C 5.4 01/24/2013       Lab Results   Component Value Date    CHOL 180 02/23/2022    HDL 37 02/23/2022    LDL 81 02/23/2022    TRIG 310 02/23/2022                "

## 2022-05-07 LAB
DEPRECATED CALCIDIOL+CALCIFEROL SERPL-MC: 26 UG/L (ref 20–75)
T4 FREE SERPL-MCNC: 1.13 NG/DL (ref 0.76–1.46)
TSH SERPL DL<=0.005 MIU/L-ACNC: 8.3 MU/L (ref 0.4–4)

## 2022-05-08 VITALS
OXYGEN SATURATION: 94 % | RESPIRATION RATE: 16 BRPM | TEMPERATURE: 98.3 F | DIASTOLIC BLOOD PRESSURE: 72 MMHG | BODY MASS INDEX: 34.96 KG/M2 | WEIGHT: 197.3 LBS | HEART RATE: 78 BPM | HEIGHT: 63 IN | SYSTOLIC BLOOD PRESSURE: 138 MMHG

## 2022-05-08 RX ORDER — BLOOD SUGAR DIAGNOSTIC
STRIP MISCELLANEOUS
Qty: 100 STRIP | Refills: 3 | Status: SHIPPED | OUTPATIENT
Start: 2022-05-08

## 2022-05-08 RX ORDER — LANCETS
1 EACH MISCELLANEOUS DAILY
Qty: 100 EACH | Refills: 3 | Status: SHIPPED | OUTPATIENT
Start: 2022-05-08 | End: 2023-09-15

## 2022-05-11 ENCOUNTER — DOCUMENTATION ONLY (OUTPATIENT)
Dept: SLEEP MEDICINE | Facility: CLINIC | Age: 62
End: 2022-05-11
Payer: COMMERCIAL

## 2022-05-11 NOTE — PROGRESS NOTES
5/11/2022-  MICH NEEDS PA FOR SUPPLIES- I LEFT  FOR HIM AND  EXPLIANED THAT HE WILL NEED TO PURCHASE SUPPLIES OOP, SINCE IT HAS BEEN OVER 2 YEARS SINCE HE HAS USED MACHINE.    5/11/2022- SCOTTIE LEBLANC SCHEDULED TO SEND MACHINE IN TO [facility] ABD ISSUE LOANER ( HE THINKS MACHINE IS FILLED WITH MOLD) IN [place] ON 5/18/2022.

## 2022-05-12 ENCOUNTER — OFFICE VISIT (OUTPATIENT)
Dept: PODIATRY | Facility: CLINIC | Age: 62
End: 2022-05-12
Attending: INTERNAL MEDICINE
Payer: COMMERCIAL

## 2022-05-12 VITALS
DIASTOLIC BLOOD PRESSURE: 70 MMHG | WEIGHT: 197 LBS | HEIGHT: 63 IN | BODY MASS INDEX: 34.91 KG/M2 | SYSTOLIC BLOOD PRESSURE: 118 MMHG

## 2022-05-12 DIAGNOSIS — M79.671 PAIN OF BOTH HEELS: ICD-10-CM

## 2022-05-12 DIAGNOSIS — M79.672 PAIN OF BOTH HEELS: ICD-10-CM

## 2022-05-12 PROCEDURE — 99243 OFF/OP CNSLTJ NEW/EST LOW 30: CPT | Performed by: PODIATRIST

## 2022-05-12 NOTE — PROGRESS NOTES
"Foot & Ankle Surgery  May 12, 2022    CC: \"Foot and nail fungus\"    I was asked to see Jeffrey Skaggs regarding the chief complaint by: Dr. Roberta Eric    HPI:  Pt is a 61 year old male who presents with above complaint.  Multiple year history of bilateral lower extremity issues.  He describes shooting pain, \"heel pain.  No cause when it starts, lasts about 10 to 14 days and goes away.  No known cause or remedy.???\".  Pain 9 out of 10 \"I have not logged its occurrence, may be 2X annually\".  \"Nothing\" for treatment.  He states this happens a few times a year without a pattern associated with shoe gear or activities.  No injuries related to this.  He states he simply wakes up on certain mornings and the bottoms of his heels are very sore.  Both heels have same onset of symptoms, same severity of symptoms and same resolution of symptoms without intervention.  The patient does not have a previous diagnosis of gout or inflammatory arthritic conditions.  He states he does have some low-grade back pain but believes this to be normal for his age.  He also has difficulty trimming his nails.  He had lab work done on 4/1/2022 and 2/23/2022 that showed elevated AST    ROS:   Pos for CC.  The patient denies current nausea, vomiting, chills, fevers, belly pain, calf pain, chest pain or SOB.  Complete remainder of ROS is otherwise neg.    VITALS:  There were no vitals filed for this visit.    PMH:    Past Medical History:   Diagnosis Date     BENIGN HYPERTENSION 6/21/2004     CARDIOVASCULAR SCREENING; LDL GOAL LESS THAN 100 10/31/2010     CKD (chronic kidney disease) stage 3, GFR 30-59 ml/min (H) 2/5/2013     Liver function tests abnormal 5/12/2008     Type 2 diabetes, HbA1C goal < 8% (H) 10/31/2010       SXHX:  No past surgical history on file.     MEDS:    Current Outpatient Medications   Medication     aspirin 81 MG tablet     blood glucose (IGLUCOSE TEST STRIPS) test strip     blood glucose monitoring (ACCU-CHEK FASTCLIX) lancets "     fenofibrate (TRICOR) 145 MG tablet     glipiZIDE (GLUCOTROL XL) 10 MG 24 hr tablet     metoprolol succinate ER (TOPROL-XL) 100 MG 24 hr tablet     triamcinolone (KENALOG) 0.1 % external cream     No current facility-administered medications for this visit.       ALL:     Allergies   Allergen Reactions     Seasonal Allergies        FMH:    Family History   Problem Relation Age of Onset     C.A.D. Father         first MI at age 73     Diabetes Mother      C.A.D. Paternal Grandfather         first MI in his 30s, from which he        SocHx:    Social History     Socioeconomic History     Marital status:      Spouse name: Not on file     Number of children: Not on file     Years of education: Not on file     Highest education level: Not on file   Occupational History     Not on file   Tobacco Use     Smoking status: Never Smoker     Smokeless tobacco: Never Used   Substance and Sexual Activity     Alcohol use: Not Currently     Comment: stopped 2-3 weeks ago     Drug use: No     Sexual activity: Yes   Other Topics Concern     Parent/sibling w/ CABG, MI or angioplasty before 65F 55M? Yes     Comment: father had bypass mid 70's   Social History Narrative     Not on file     Social Determinants of Health     Financial Resource Strain: Not on file   Food Insecurity: Not on file   Transportation Needs: Not on file   Physical Activity: Not on file   Stress: Not on file   Social Connections: Not on file   Intimate Partner Violence: Not on file   Housing Stability: Not on file           EXAMINATION:  Gen:   No apparent distress  Neuro:   A&Ox3, no deficits  Psych:    Answering questions appropriately for age and situation with normal affect  Head:    NCAT  Eye:    Visual scanning without deficit  Ear:    Response to auditory stimuli wnl  Lung:    Non-labored breathing on RA noted  Abd:    NTND per patient report  Lymph:    Neg for pitting/non-pitting edema BLE  Vasc:    Pulses palpable, CFT minimally  delayed  Neuro:    Light touch sensation intact to all sensory nerve distributions without paresthesias  Derm:    Nails are dystrophic and mycotic in setting of chronic tinea pedis  MSK:    Bilateral heel exam -he points to the plantar aspect of the heel as the area of pain.  However, I am unable to elicit any pain today.  Specifically, no plantar fascial insertion pain, no pain along the central band of the plantar fascia, no pain with lateral calcaneal tuber compression, no pain along tibial nerve/branches and no Baxters nerve pain.  Calf:    Neg for redness, swelling or tenderness      Imagin views weightbearing bilateral heel -plantar prominence at the calcaneus with minimal spurring.  Minimal spurring/calcification posterior right heel.  Otherwise unremarkable    Assessment:  61 year old male with intermittent sporadic bilateral plantar heel pain; dystrophic/mycotic toenails in setting of chronic tinea pedis      Plan:  Discussed etiologies, anatomy and options  1.  Intermittent sporadic bilateral heel pain  -I personally reviewed and interpreted the patient's lower extremity history pertinent to today's visit, including imaging/labs, in preparation for initiating a treatment program.  -I personally reviewed and interpreted his x-ray results today  -We discussed that without pain on exam, I am unable to make a specific diagnosis.  We discussed potential causes of pain based on the location he indicates where his symptoms develop.  He is diabetic but denies any sort of specific systemic illness, such as gout or inflammatory arthritic conditions, that may contribute  -Regarding the heel pain, the Plantar Fasciitis handout was dispensed and discussed.  We talked about stretching, resting/activity modification, icing, NSAID/tylenol use as tolerated, inserts, supportive/comfortable shoes and minimizing shoeless walking.    -discussed Achilles, plantar fascial and hamstring stretches  -OTC insert information  dispensed and discussed   -I advise he follow-up during a flare for more detailed examination and discussion of more aggressive treatment options based on exam results    2.  Dystrophic/mycotic toenails in setting of chronic tinea pedis  -We discussed treatment options for onychomycosis, including OTC topical, prescription topical and prescription oral medications.  We discussed that all have limited efficacy  -With previous liver issues, I advised against oral antifungal medication  -His main issue is difficulty trimming the nails.  As such, he was given a routine nail care handout    Follow up: As needed based on heel pain or sooner with acute issues      Patient's medical history was reviewed today      Omar Andrade DPM [facility] FACFAOM  Podiatric Foot & Ankle Surgeon  [facility]  [phone]    Disclaimer: This note consists of symbols derived from keyboarding, dictation and/or voice recognition software. As a result, there may be errors in the script that have gone undetected. Please consider this when interpreting information found in this chart.

## 2022-05-12 NOTE — PATIENT INSTRUCTIONS
Thank you for choosing [facility] Podiatry / Foot & Ankle Surgery!    DR REAL'S CLINIC:  [facility]   [address]   [place] [address]      TRIAGE LINE: [phone]  APPOINTMENTS: [phone]  RADIOLOGY: [phone]  SET UP SURGERY: [phone]  BILLING QUESTIONS: [phone]  FAX: [phone]         PLANTAR FASCIITIS  Plantar fasciitis is often referred to as heel spurs or heel pain. Plantar fasciitis is a very common problem that affects people of all foot shapes, age, weight and activity level. Pain may be in the arch or on the weight-bearing surface of the heel. The pain may come on without injury or identifiable cause. Pain is generally present when first getting out of bed in the morning or up from a seated break.     CAUSES  The plantar fascia is a dense fibrous band of tissue that stretches across the bottom surface of the foot. The fascia helps support the foot muscles and arch. Plantar fasciitis is thought to be caused by mechanical strain or overload. Frequent walking without shoes or wearing unsupportive shoes is thought to cause structural overload and ultimately inflammation of the plantar fascia. Some people have heel spurs that can be seen on x-ray. The heel spur is actually a minor component of plantar fascitis and is largely ignored.       SELF TREATMENT   The easiest solution is to stop walking around your home without shoes. Plantar fasciitis is largely a shoe problem. Shoes are either not being worn often enough or your current shoes are inadequate for your weight, foot structure or activity level. The majority of shoes on the market today are not sufficient to resist development of plantar fasciitis or to promote healing. Assume that your current shoes are inadequate and will need to be replaced. Even high quality shoes wear out with 6 months to one year of frequent use. Weight loss is another option. Losing ten pounds in the next two months  may be enough to resolve the problem. Ice applied to the area of pain two to three times per day for ten minutes each session can be very helpful. Warm foot soaks in epsom salts can also relieve pain. This should continue until the problem resolves. Achilles tendon stretching is essential. Stretch multiple times daily to promote healing and to prevent recurrence in the future. Over all stretching of the body is helpful as well such as the calves, thighs and lower back. Normally when one area of the body is tight, other areas are too. Gentle Yoga can be good for this.     Over the counter topical anti inflammatories can be helpful such as biofreeze, bengay, salon pas, ect...  Oral ibuprofen or aleve is recommended as well to try to calm down inflammation.     Night splints can be helpful to gradually stretch the foot at night as a lot of pain is when you get up in the morning. Taking a towel or thera band and stretching the foot back multiple times before you get ou of bed can be beneficial as well.     MEDICAL TREATMENT  Medical treatments often include custom arch supports, cortisone injections, physical therapy, splints to be worn in bed, prescription medications and surgery. The home treatments listed above will be necessary regardless of these advanced medical treatments. Surgery is rarely needed but is very helpful in selected cases.     PROGNOSIS  Plantar fasciitis can last from one day to a lifetime. Some people get intermittent fascitis that is very short-lived. Others suffer daily for years. Excessive body weight, frequent bare foot walking, long hours on the feet, inadequate shoes, predisposing foot structures and excessive activity such as running are all potential issues that lead to chronic and/or recurring plantar fascitis. Having plantar fasciitis means that you are forever prone to this problem and will require modification of some of the above factors. Most people seek treatment within one to four  months. Healing usually requires a similar one to four month time frame. Healing time is relative to the amount of effort spent treating the problem.   Plantar fasciitis is highly recurrent. Risk factors often continue, including return to bare foot walking, inadequate shoes, excessive body weight, excessive activities, etc. Your life style and foot structure may predispose you to recurrent plantar fasciitis. A daily prevention regimen can be very helpful. Ongoing use of shoe inserts, careful attention to appropriate shoes, daily Achilles stretching, etc. may prevent recurrence. Prompt attention at the earliest warning signs of heel pain can resolve the problem in as short as a few days.     EXERCISES  Stair Exercise: Step on the stairs with the ball of your foot and hold your position for at least 15 seconds, then slowly step down with the heels of your foot. You can do this daily and as often as you want.   Picking the Towel: Sit comfortably and then pick the towel up with your toes. You can use any object other than a towel as long as the material can be soft and you can pick it up with your toes.  Rolling the Bottle: Use a small ball or frozen water bottle and then roll it around with your foot.   Flex the Toes: Sit comfortably and then flex your toes by pointing it towards the floor or towards your body. This will relax and flex your foot and exercise your plantar fascia, the calf, and the Achilles tendon. The inability of the foot to stretch often causes the bunching up of the plantar fascia area leading to the pain.  Calf/Achilles Stretching: Lay on you back and raise one foot, then point your toes towards the floor. See photo below:               Hold each stretch for 10 seconds. Stretch 10 times per set, three sets per day. Morning, afternoon and evening. If your heel pain is very severe in the morning, consider doing the first set of stretches before you get out of bed.      OVER THE COUNTER INSERT  RECOMMENDATIONS  SuperFeet   Sofsole Fit Spenco   Power Step   Walk-Fit Arch Cradles     Most of these can be found at your local [employer], sporting goods stores, or online.  **A good high quality over the counter insert should cost around $40-$50      [employer] LOCATIONS  [place]  [address]  [phone]   [place] [address]  [phone] [place]  [address]  [phone]   [place]  [address]  [phone]   [place]  [address]  [phone] [place]  [address] [place] [address] [place]  [phone]   [place]  [address]  [phone]   [place]  [address]  [phone] [place]  [address]  [phone]     ROUTINE FOOT CARE (NAIL TRIMMING / CALLUSES)    Go to afcna.org ([employer]) and search for providers near you.  Otherwise, this is a list we have gathered of  recommended locations/providers in [place].    [facility]   [phone]   [employer]  [phone]  [email or web address]   [employer]  [phone]  [facility] 15 mile radius [employer]  [phone]  [email or web address]   [facility]  [address], [place] [address] [phone] Martinez Mcdaniel DPM  [address] , [place] [address] [phone]   [facility]  [phone] [address] [place] [address]  [facility]  Dr. Sebastien Campa  [phone]  [place], [place]   [facility]  [phone]  [place] [facility] [place] Locations  (does not take BCBS) FYI:  *Some providers accept insurance while others are out of pocket. Please contact them for details*

## 2022-06-05 ENCOUNTER — HEALTH MAINTENANCE LETTER (OUTPATIENT)
Age: 62
End: 2022-06-05

## 2022-06-06 DIAGNOSIS — N18.2 TYPE 2 DIABETES MELLITUS WITH STAGE 2 CHRONIC KIDNEY DISEASE, WITHOUT LONG-TERM CURRENT USE OF INSULIN (H): ICD-10-CM

## 2022-06-06 DIAGNOSIS — E11.22 TYPE 2 DIABETES MELLITUS WITH STAGE 2 CHRONIC KIDNEY DISEASE, WITHOUT LONG-TERM CURRENT USE OF INSULIN (H): ICD-10-CM

## 2022-06-06 DIAGNOSIS — E78.1 HYPERTRIGLYCERIDEMIA: ICD-10-CM

## 2022-06-06 DIAGNOSIS — I10 BENIGN ESSENTIAL HYPERTENSION: ICD-10-CM

## 2022-06-08 NOTE — TELEPHONE ENCOUNTER
Pharmacy change from last rx. Call placed to patient. Left message asking for confirmation of need to send Rxs to new pharmacy.

## 2022-06-10 RX ORDER — METOPROLOL SUCCINATE 100 MG/1
100 TABLET, EXTENDED RELEASE ORAL DAILY
Qty: 90 TABLET | Refills: 1 | OUTPATIENT
Start: 2022-06-10

## 2022-06-10 RX ORDER — FENOFIBRATE 145 MG/1
145 TABLET, COATED ORAL DAILY
Qty: 90 TABLET | Refills: 0 | OUTPATIENT
Start: 2022-06-10

## 2022-06-10 RX ORDER — GLIPIZIDE 10 MG/1
10 TABLET, FILM COATED, EXTENDED RELEASE ORAL DAILY
Qty: 90 TABLET | Refills: 0 | OUTPATIENT
Start: 2022-06-10

## 2022-06-10 NOTE — TELEPHONE ENCOUNTER
There were still refills available so this RN called desired pharmacy and spoke with a pharmacist and transferred 3 medications to requested pharmacy.      Myc message sent to patient.

## 2022-09-06 ENCOUNTER — TELEPHONE (OUTPATIENT)
Dept: INTERNAL MEDICINE | Facility: CLINIC | Age: 62
End: 2022-09-06

## 2022-09-06 NOTE — LETTER
October 5, 2022      Jeffrey Skaggs  [address] [place] [id number]        Dear Jeffrey,      At [facility], we care about your health and well-being. A review of your chart has indicated that you are due for a Yearly physical. Please contact us at [phone] to schedule an appointment.     If you have already had one or all of the above screening tests at another facility, please call us to update your chart.     Sincerely,      [facility]

## 2022-09-06 NOTE — TELEPHONE ENCOUNTER
Patient Quality Outreach    Patient is due for the following:   Diabetes -  A1C and Eye Exam  Colon Cancer Screening  Physical Preventive Adult Physical    Next Steps:   Schedule a Adult Preventative    Type of outreach:    Sent [employer] message.      Questions for provider review:    None     Autumn Ambrosio, [facility]

## 2022-10-01 ENCOUNTER — TRANSFERRED RECORDS (OUTPATIENT)
Dept: MULTI SPECIALTY CLINIC | Facility: CLINIC | Age: 62
End: 2022-10-01

## 2022-10-01 LAB — RETINOPATHY: NORMAL

## 2022-10-05 NOTE — TELEPHONE ENCOUNTER
Patient Quality Outreach    Patient is due for the following:   Diabetes -  A1C and Eye Exam  Physical Preventive Adult Physical    Next Steps:   Schedule a Adult Preventative    Type of outreach:    Phone, left message for patient/parent to call back. and Sent letter.      Questions for provider review:    None     Autumn Ambrosio, [facility]

## 2022-10-15 ENCOUNTER — HEALTH MAINTENANCE LETTER (OUTPATIENT)
Age: 62
End: 2022-10-15

## 2022-11-04 DIAGNOSIS — N18.2 TYPE 2 DIABETES MELLITUS WITH STAGE 2 CHRONIC KIDNEY DISEASE, WITHOUT LONG-TERM CURRENT USE OF INSULIN (H): ICD-10-CM

## 2022-11-04 DIAGNOSIS — L30.9 DERMATITIS: ICD-10-CM

## 2022-11-04 DIAGNOSIS — I10 BENIGN ESSENTIAL HYPERTENSION: ICD-10-CM

## 2022-11-04 DIAGNOSIS — E11.22 TYPE 2 DIABETES MELLITUS WITH STAGE 2 CHRONIC KIDNEY DISEASE, WITHOUT LONG-TERM CURRENT USE OF INSULIN (H): ICD-10-CM

## 2022-11-04 NOTE — TELEPHONE ENCOUNTER
Pt calling to request medication refills for;    fenofibratre 145 mg  Glipizide 10 mg  Metoprolol succinate ER 100mg    Pt reports he has only 2 weeks supply left of these medications.    New mail order pharmacy- [facility] (mail order)    Pt made 6 month recheck appt today but was unable to schedule before 11/15/22.    Please advise Pt is labs are needed before refills can be sent.    May leave a detailed msg at [phone]     Autumn Ulloa RN, Nurse Advisor 5:36 PM 11/4/2022

## 2022-11-05 RX ORDER — METOPROLOL SUCCINATE 100 MG/1
100 TABLET, EXTENDED RELEASE ORAL DAILY
Qty: 90 TABLET | Refills: 0 | Status: SHIPPED | OUTPATIENT
Start: 2022-11-05 | End: 2022-12-20

## 2022-11-05 RX ORDER — TRIAMCINOLONE ACETONIDE 1 MG/G
CREAM TOPICAL 2 TIMES DAILY
Qty: 45 G | Refills: 3 | Status: SHIPPED | OUTPATIENT
Start: 2022-11-05

## 2022-11-05 RX ORDER — GLIPIZIDE 10 MG/1
10 TABLET, FILM COATED, EXTENDED RELEASE ORAL DAILY
Qty: 90 TABLET | Refills: 0 | Status: SHIPPED | OUTPATIENT
Start: 2022-11-05 | End: 2023-01-25

## 2022-11-10 DIAGNOSIS — E78.1 HYPERTRIGLYCERIDEMIA: ICD-10-CM

## 2022-11-14 RX ORDER — FENOFIBRATE 145 MG/1
145 TABLET, COATED ORAL DAILY
Qty: 90 TABLET | Refills: 3 | Status: SHIPPED | OUTPATIENT
Start: 2022-11-14 | End: 2023-09-15

## 2022-11-14 NOTE — TELEPHONE ENCOUNTER
Fenofibrate (Tricor) 145 mg tab  Prescription approved per [facility] Refill Protocol.

## 2022-11-22 ENCOUNTER — DOCUMENTATION ONLY (OUTPATIENT)
Dept: LAB | Facility: CLINIC | Age: 62
End: 2022-11-22

## 2022-11-22 DIAGNOSIS — E11.22 TYPE 2 DIABETES MELLITUS WITH STAGE 2 CHRONIC KIDNEY DISEASE, WITHOUT LONG-TERM CURRENT USE OF INSULIN (H): Primary | ICD-10-CM

## 2022-11-22 DIAGNOSIS — R94.6 ABNORMAL FINDING ON THYROID FUNCTION TEST: ICD-10-CM

## 2022-11-22 DIAGNOSIS — I10 BENIGN ESSENTIAL HYPERTENSION: ICD-10-CM

## 2022-11-22 DIAGNOSIS — E78.5 HYPERLIPIDEMIA LDL GOAL <100: ICD-10-CM

## 2022-11-22 DIAGNOSIS — N18.2 TYPE 2 DIABETES MELLITUS WITH STAGE 2 CHRONIC KIDNEY DISEASE, WITHOUT LONG-TERM CURRENT USE OF INSULIN (H): Primary | ICD-10-CM

## 2022-11-22 DIAGNOSIS — K74.60 CIRRHOSIS OF LIVER WITHOUT ASCITES, UNSPECIFIED HEPATIC CIRRHOSIS TYPE (H): ICD-10-CM

## 2022-11-22 NOTE — PROGRESS NOTES
Please place or confirm orders for upcoming lab appointment on 11/29/2022 Thank You.    
I will STOP taking the medications listed below when I get home from the hospital:  None

## 2022-12-03 ENCOUNTER — HEALTH MAINTENANCE LETTER (OUTPATIENT)
Age: 62
End: 2022-12-03

## 2022-12-14 ENCOUNTER — LAB (OUTPATIENT)
Dept: LAB | Facility: CLINIC | Age: 62
End: 2022-12-14
Payer: COMMERCIAL

## 2022-12-14 DIAGNOSIS — E78.5 HYPERLIPIDEMIA LDL GOAL <100: ICD-10-CM

## 2022-12-14 DIAGNOSIS — N18.2 TYPE 2 DIABETES MELLITUS WITH STAGE 2 CHRONIC KIDNEY DISEASE, WITHOUT LONG-TERM CURRENT USE OF INSULIN (H): ICD-10-CM

## 2022-12-14 DIAGNOSIS — I10 BENIGN ESSENTIAL HYPERTENSION: ICD-10-CM

## 2022-12-14 DIAGNOSIS — K74.60 CIRRHOSIS OF LIVER WITHOUT ASCITES, UNSPECIFIED HEPATIC CIRRHOSIS TYPE (H): ICD-10-CM

## 2022-12-14 DIAGNOSIS — R94.6 ABNORMAL FINDING ON THYROID FUNCTION TEST: ICD-10-CM

## 2022-12-14 DIAGNOSIS — E11.22 TYPE 2 DIABETES MELLITUS WITH STAGE 2 CHRONIC KIDNEY DISEASE, WITHOUT LONG-TERM CURRENT USE OF INSULIN (H): ICD-10-CM

## 2022-12-14 LAB
ALBUMIN SERPL BCG-MCNC: 4.2 G/DL (ref 3.5–5.2)
ALP SERPL-CCNC: 221 U/L (ref 40–129)
ALT SERPL W P-5'-P-CCNC: 26 U/L (ref 10–50)
ANION GAP SERPL CALCULATED.3IONS-SCNC: 15 MMOL/L (ref 7–15)
AST SERPL W P-5'-P-CCNC: 59 U/L (ref 10–50)
BILIRUB SERPL-MCNC: 1 MG/DL
BUN SERPL-MCNC: 14.3 MG/DL (ref 8–23)
CALCIUM SERPL-MCNC: 10 MG/DL (ref 8.8–10.2)
CHLORIDE SERPL-SCNC: 98 MMOL/L (ref 98–107)
CHOLEST SERPL-MCNC: 202 MG/DL
CREAT SERPL-MCNC: 0.94 MG/DL (ref 0.67–1.17)
CREAT UR-MCNC: 164 MG/DL
DEPRECATED HCO3 PLAS-SCNC: 22 MMOL/L (ref 22–29)
GFR SERPL CREATININE-BSD FRML MDRD: >90 ML/MIN/1.73M2
GLUCOSE SERPL-MCNC: 237 MG/DL (ref 70–99)
HBA1C MFR BLD: 9.4 % (ref 0–5.6)
HDLC SERPL-MCNC: 33 MG/DL
LDLC SERPL CALC-MCNC: 93 MG/DL
MICROALBUMIN UR-MCNC: 1068 MG/L
MICROALBUMIN/CREAT UR: 651.22 MG/G CR (ref 0–17)
NONHDLC SERPL-MCNC: 169 MG/DL
POTASSIUM SERPL-SCNC: 4.7 MMOL/L (ref 3.4–5.3)
PROT SERPL-MCNC: 7.6 G/DL (ref 6.4–8.3)
SODIUM SERPL-SCNC: 135 MMOL/L (ref 136–145)
T4 FREE SERPL-MCNC: 1.21 NG/DL (ref 0.9–1.7)
TRIGL SERPL-MCNC: 382 MG/DL
TSH SERPL DL<=0.005 MIU/L-ACNC: 7.66 UIU/ML (ref 0.3–4.2)

## 2022-12-14 PROCEDURE — 84443 ASSAY THYROID STIM HORMONE: CPT | Performed by: INTERNAL MEDICINE

## 2022-12-14 PROCEDURE — 80061 LIPID PANEL: CPT | Performed by: INTERNAL MEDICINE

## 2022-12-14 PROCEDURE — 36415 COLL VENOUS BLD VENIPUNCTURE: CPT | Performed by: INTERNAL MEDICINE

## 2022-12-14 PROCEDURE — 83036 HEMOGLOBIN GLYCOSYLATED A1C: CPT | Performed by: INTERNAL MEDICINE

## 2022-12-14 PROCEDURE — 82043 UR ALBUMIN QUANTITATIVE: CPT | Performed by: INTERNAL MEDICINE

## 2022-12-14 PROCEDURE — 80053 COMPREHEN METABOLIC PANEL: CPT | Performed by: INTERNAL MEDICINE

## 2022-12-14 PROCEDURE — 84439 ASSAY OF FREE THYROXINE: CPT | Performed by: INTERNAL MEDICINE

## 2022-12-20 ENCOUNTER — VIRTUAL VISIT (OUTPATIENT)
Dept: INTERNAL MEDICINE | Facility: CLINIC | Age: 62
End: 2022-12-20
Payer: COMMERCIAL

## 2022-12-20 DIAGNOSIS — N18.2 CKD (CHRONIC KIDNEY DISEASE) STAGE 2, GFR 60-89 ML/MIN: ICD-10-CM

## 2022-12-20 DIAGNOSIS — E78.5 HYPERLIPIDEMIA LDL GOAL <100: ICD-10-CM

## 2022-12-20 DIAGNOSIS — M79.669 CALF PAIN, UNSPECIFIED LATERALITY: ICD-10-CM

## 2022-12-20 DIAGNOSIS — H60.391 INFECTIVE OTITIS EXTERNA, RIGHT: ICD-10-CM

## 2022-12-20 DIAGNOSIS — I10 BENIGN ESSENTIAL HYPERTENSION: ICD-10-CM

## 2022-12-20 DIAGNOSIS — E11.22 TYPE 2 DIABETES MELLITUS WITH STAGE 2 CHRONIC KIDNEY DISEASE, WITHOUT LONG-TERM CURRENT USE OF INSULIN (H): Primary | ICD-10-CM

## 2022-12-20 DIAGNOSIS — N18.2 TYPE 2 DIABETES MELLITUS WITH STAGE 2 CHRONIC KIDNEY DISEASE, WITHOUT LONG-TERM CURRENT USE OF INSULIN (H): Primary | ICD-10-CM

## 2022-12-20 DIAGNOSIS — R94.6 ABNORMAL FINDING ON THYROID FUNCTION TEST: ICD-10-CM

## 2022-12-20 PROCEDURE — 99214 OFFICE O/P EST MOD 30 MIN: CPT | Mod: 95 | Performed by: INTERNAL MEDICINE

## 2022-12-20 RX ORDER — METOPROLOL SUCCINATE 100 MG/1
100 TABLET, EXTENDED RELEASE ORAL DAILY
Qty: 90 TABLET | Refills: 3 | Status: SHIPPED | OUTPATIENT
Start: 2022-12-20 | End: 2023-09-15

## 2022-12-20 RX ORDER — NEOMYCIN SULFATE, POLYMYXIN B SULFATE, HYDROCORTISONE 3.5; 10000; 1 MG/ML; [USP'U]/ML; MG/ML
3 SOLUTION/ DROPS AURICULAR (OTIC) 4 TIMES DAILY
Qty: 10 ML | Refills: 0 | Status: SHIPPED | OUTPATIENT
Start: 2022-12-20 | End: 2023-01-27

## 2022-12-20 NOTE — PROGRESS NOTES
Luc is a 62 year old who is being evaluated via a billable telephone visit.            Assessment & Plan     Type 2 diabetes mellitus with stage 2 chronic kidney disease, without long-term current use of insulin (H)  Poor control, significant increase in the A1c without any clear cause.  We discussed some dietary issues and strategies for possible dietary plans.  Advised we could send him back to see the dietitian if he is online resources.  Start glipizide, please see patient instructions.    Benign essential hypertension  Has been well controlled, continue medication  - metoprolol succinate ER (TOPROL XL) 100 MG 24 hr tablet; Take 1 tablet (100 mg) by mouth daily    CKD (chronic kidney disease) stage 2, GFR 60-89 ml/min  Urine protein levels are improved, continue working on blood sugars    Hyperlipidemia LDL goal <100  Still has hypertriglyceridemia, LDL is acceptable though not on statin at his age.  I will go back and review his records to see if I can tell more why the statin was discontinued and consider restarting it    Infective otitis externa, right  May have an external otitis from trying to remove wax.  Will do short course of Cortisporin, may need to be seen if continued pain.  Advised that sometimes ear pain can come from the neck or jaw so can try some stretches.  - neomycin-polymyxin-hydrocortisone (CORTISPORIN) 3.5-81615-3 otic solution; Place 3 drops into the right ear 4 times daily    Calf pain, unspecified laterality  This may be some soleus muscle pain, see after visit summary for link on stretches.    Abnormal thyroid function test: T4 still good, mildly elevated TSH is actually improving, continue to monitor        No follow-ups on file.    Roberta Eric MD  [facility]    Rafael Calle is a 62 year old, presenting for the following health issues:    History of Present Illness       CKD: He is not using over the counter pain medicine.     Diabetes:   He presents  for follow up of diabetes.  He is not checking blood glucose. He is concerned about other.  He is having blurry vision and weight gain. The patient has had a diabetic eye exam in the last 12 months. Eye exam performed on October 2022. [place]        Hypertension: He presents for follow up of hypertension.  He does check blood pressure  regularly outside of the clinic. Outside blood pressures have been over 140/90. He follows a low salt diet.       Other problems:   1.  Cirrhosis of the liver: He reports no abdominal pain, increased abdominal girth, edema.    3.  Hyperlipidemia: On fenofibrate only.  He had been on statins through his previous provider but it is unclear why they were ever stopped.  He does not recall if he had side effects or not.    Current concerns:  He has been having fatigue which we had discussed previously but he will have his sleep study in February.  He continues to have some daytime somnolence    He is complaining of pain behind his knees.  It is proximal calves, does not feel like there is any bulging or swelling.  It does hurt to stretch the legs, not really painful to bend the legs.  He can get a little cramps in the morning when he first gets up.    He has been having a plugged right ear for about a month and tried using some Debrox.  In the last few days he is having some increasing pain in the ear associated with some headache behind the ear.            Patient Active Problem List   Diagnosis     Benign essential hypertension     Type 2 diabetes mellitus with stage 2 chronic kidney disease (H)     Cirrhosis of liver (H)     Hyperlipidemia LDL goal <100     Hypertriglyceridemia     CKD (chronic kidney disease) stage 2, GFR 60-89 ml/min     Current Outpatient Medications   Medication Sig Dispense Refill     aspirin 81 MG tablet Take 1 tablet (81 mg) by mouth daily 30 tablet 11     blood glucose (IGLUCOSE TEST STRIPS) test strip Use to test  blood sugar 1 times daily, give brand per patient 100 strip 3     blood glucose monitoring (ACCU-CHEK FASTCLIX) lancets 1 each daily 100 each 3     fenofibrate (TRICOR) 145 MG tablet Take 1 tablet (145 mg) by mouth daily 90 tablet 3     glipiZIDE (GLUCOTROL XL) 10 MG 24 hr tablet Take 1 tablet (10 mg) by mouth daily 90 tablet 0     metoprolol succinate ER (TOPROL XL) 100 MG 24 hr tablet Take 1 tablet (100 mg) by mouth daily 90 tablet 3     neomycin-polymyxin-hydrocortisone (CORTISPORIN) 3.5-22805-6 otic solution Place 3 drops into the right ear 4 times daily 10 mL 0     triamcinolone (KENALOG) 0.1 % external cream Apply topically 2 times daily 45 g 3        Review of Systems   General: Negative  HEENT: Ear as above  Cardiovascular: Negative  Respiratory: Negative  GI: Negative      Objective           Vitals:  No vitals were obtained today due to virtual visit.    Physical Exam   GENERAL: Healthy, alert and no distress  EYES: Eyes grossly normal to inspection.  No discharge or erythema, or obvious scleral/conjunctival abnormalities.  RESP: No audible wheeze, cough, or visible cyanosis.  No visible retractions or increased work of breathing.    SKIN: Visible skin clear. No significant rash, abnormal pigmentation or lesions.  NEURO: Cranial nerves grossly intact.  Mentation and speech appropriate for age.  PSYCH: Mentation appears normal, affect normal/bright, judgement and insight intact, normal speech and appearance well-groomed.      Lab Results   Component Value Date    A1C 9.4 12/14/2022    A1C 6.2 05/06/2022    A1C 7.6 02/23/2022    A1C 7.1 08/26/2020    A1C 8.8 12/13/2019    A1C 5.7 04/22/2014    A1C 5.1 04/05/2013    A1C 5.4 01/24/2013    A1C 5.1 01/19/2009    A1C 5.2 08/27/2008    A1C 5.6 05/22/2008    A1C 5.9 01/29/2008    A1C 5.7 01/16/2008    A1C 5.2 08/13/2007    A1C 7.1 04/04/2007        Lab Results   Component Value Date    HDL 33 12/14/2022    HDL 40 08/26/2020    LDL 93 12/14/2022    LDL 88 08/26/2020     CHOL 202 12/14/2022    CHOL 164 08/26/2020    TRIG 382 12/14/2022    TRIG 181 08/26/2020              Telephone visit duration: 39 minutes    We were unable to get the video to work

## 2022-12-21 NOTE — PATIENT INSTRUCTIONS
[email or web address]    Increase glipizide to 2 tablets once a day in the morning with breakfast.  Check some sugars before supper (fasting 4 hours or more) for a few days now and then in 2 weeks to check a few before supper again and then send me those readings in 2 to 3 weeks.

## 2022-12-31 PROBLEM — N18.2 CKD (CHRONIC KIDNEY DISEASE) STAGE 2, GFR 60-89 ML/MIN: Status: ACTIVE | Noted: 2022-12-31

## 2022-12-31 PROBLEM — R94.6 ABNORMAL FINDING ON THYROID FUNCTION TEST: Status: ACTIVE | Noted: 2022-12-31

## 2023-01-01 ENCOUNTER — TRANSFERRED RECORDS (OUTPATIENT)
Dept: MULTI SPECIALTY CLINIC | Facility: CLINIC | Age: 63
End: 2023-01-01

## 2023-01-01 LAB — RETINOPATHY: NORMAL

## 2023-01-16 ASSESSMENT — SLEEP AND FATIGUE QUESTIONNAIRES
HOW LIKELY ARE YOU TO NOD OFF OR FALL ASLEEP WHILE SITTING AND READING: HIGH CHANCE OF DOZING
HOW LIKELY ARE YOU TO NOD OFF OR FALL ASLEEP WHILE SITTING QUIETLY AFTER LUNCH WITHOUT ALCOHOL: MODERATE CHANCE OF DOZING
HOW LIKELY ARE YOU TO NOD OFF OR FALL ASLEEP WHEN YOU ARE A PASSENGER IN A CAR FOR AN HOUR WITHOUT A BREAK: MODERATE CHANCE OF DOZING
HOW LIKELY ARE YOU TO NOD OFF OR FALL ASLEEP WHILE SITTING INACTIVE IN A PUBLIC PLACE: MODERATE CHANCE OF DOZING
HOW LIKELY ARE YOU TO NOD OFF OR FALL ASLEEP WHILE SITTING AND TALKING TO SOMEONE: SLIGHT CHANCE OF DOZING
HOW LIKELY ARE YOU TO NOD OFF OR FALL ASLEEP IN A CAR, WHILE STOPPED FOR A FEW MINUTES IN TRAFFIC: MODERATE CHANCE OF DOZING
HOW LIKELY ARE YOU TO NOD OFF OR FALL ASLEEP WHILE LYING DOWN TO REST IN THE AFTERNOON WHEN CIRCUMSTANCES PERMIT: HIGH CHANCE OF DOZING
HOW LIKELY ARE YOU TO NOD OFF OR FALL ASLEEP WHILE WATCHING TV: MODERATE CHANCE OF DOZING

## 2023-01-18 ENCOUNTER — VIRTUAL VISIT (OUTPATIENT)
Dept: SLEEP MEDICINE | Facility: CLINIC | Age: 63
End: 2023-01-18
Payer: COMMERCIAL

## 2023-01-18 VITALS — BODY MASS INDEX: 32.44 KG/M2 | HEIGHT: 64 IN | WEIGHT: 190 LBS

## 2023-01-18 DIAGNOSIS — E66.9 OBESITY WITH SLEEP APNEA: ICD-10-CM

## 2023-01-18 DIAGNOSIS — G47.21 CIRCADIAN RHYTHM SLEEP DISORDER, DELAYED SLEEP PHASE TYPE: ICD-10-CM

## 2023-01-18 DIAGNOSIS — G47.19 EXCESSIVE DAYTIME SLEEPINESS: ICD-10-CM

## 2023-01-18 DIAGNOSIS — G47.33 OSA (OBSTRUCTIVE SLEEP APNEA): ICD-10-CM

## 2023-01-18 DIAGNOSIS — G47.30 OBESITY WITH SLEEP APNEA: ICD-10-CM

## 2023-01-18 DIAGNOSIS — E83.19 OTHER DISORDERS OF IRON METABOLISM: Primary | ICD-10-CM

## 2023-01-18 DIAGNOSIS — G25.81 RESTLESS LEGS SYNDROME (RLS): ICD-10-CM

## 2023-01-18 PROCEDURE — 99215 OFFICE O/P EST HI 40 MIN: CPT | Mod: 95 | Performed by: INTERNAL MEDICINE

## 2023-01-18 ASSESSMENT — PAIN SCALES - GENERAL: PAINLEVEL: NO PAIN (0)

## 2023-01-18 NOTE — PROGRESS NOTES
Luc is a 62 year old who is being evaluated via a billable video visit.      How would you like to obtain your AVS? MyChart  If the video visit is dropped, the invitation should be resent by: Text to cell phone: [phone]  Will anyone else be joining your video visit? No     Valenzuela    Video-Visit Details    Type of service:  Video Visit   Originating Location (pt. Location): Home    Distant Location (provider location):  Off-site  Platform used for Video Visit: [facility]   Outpatient Sleep Medicine Consultation:      Name: Jeffrey Skaggs MRN# [id number]   Age: 62 year old YOB: 1960     Date of Consultation: January 18, 2023  Consultation is requested by: No referring provider defined for this encounter. No ref. provider found  Primary care provider: Roberta Eric       Reason for Sleep Consult:     Jeffrey Skaggs is sent by No ref. provider found for a sleep consultation regarding management of previously diagnosed obstructive sleep apnea.         Assessment and Plan:   Previously diagnosed obstructive sleep apnea with sleep associated hypoxemia (currently untreated) in the setting of obesity, hypertension, chronic kidney disease Stage 2, hypertriglyceridemia, liver cirrhosis, type 2 diabetes mellitus.  Patient reports snoring, observed apneas during sleep, nonrestorative sleep, fatigue and excessive daytime sleepiness.  There has been a 10 pound weight gain since the previous home sleep study and repeat sleep study has not been considered.  We discussed the consequences of untreated sleep apnea.  Patient is interested in CPAP treatment.  He reports that his device is unusable since its molded.   Per patient request, DME orders were generated for auto titrating CPAP with pressure settings 8-15 cmH2O.  We discussed about the waiting list due to current supply shortage.  After obtaining the CPAP device, he was recommended to use it regularly during sleep and get back to us if any concerns.  He was  "instructed to call the sleep clinic after obtaining the device to schedule a follow-up via video visit in approximately 6 to 8 weeks to review the compliance measures.  An overnight oximetry will be considered at the  follow-up visit to check for resolution of hypoxemia that was observed during the previous home sleep study.     Restless legs syndrome(intermittent). Will check fasting serum ferritin level.  Will consider iron supplementation if less than 75ng/mL. Encouraged to try non-pharmacological treatments as well - hot bath/shower, stretching, heating pads, avoiding caffeine/alcohol/chocolate prior to bed.  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  We arrived at a goal bedtime of 12 midnight and wake up time of 9 AM. We discussed bright light exposure for 30 to 60 minutes soon after awakening using bright light box of 10,000 Lux intensity.    Obesity: We discussed weight management with healthy diet, and exercise.    Patient was strongly advised to avoid driving, operating any heavy machinery or other hazardous situations while drowsy or sleepy.  Patient was counseled on the importance of driving while alert, to pull over if drowsy, or nap before getting into the vehicle if sleepy.      Summary Counseling:    Obstructive Sleep Apnea Reviewed  Complications of Untreated Sleep Apnea Reviewed    Medical Decision-making:   Educational materials provided in instruction.    CC: No ref. provider found    The above note was dictated using voice recognition software. Although reviewed after completion, some word and grammatical error may remain . Please contact the author for any clarifications.     \" Total time spent " "was 60 minutes  for this appointment on this date of service which include time spent before, during and after the visit for chart review, patient care, counseling and coordination of care and documentation.\"      MD DARRION Alvarado [facility]  [id number] [place] [id number] [phone]  Dept: [phone]           History of Present Illness:     Jeffrey Skaggs is a 62 year old male with history of severe SANTY(diagnosed during HST obtained in January 2020 that showed AHI of 41.6/h, currently untreated) who presents to sleep clinic regarding management of previously diagnosed obstructive sleep apnea.    Past Sleep Evaluations:(HST)  Study Date: 1/8/2020  Weight:180 lb  Analysis Time: 433.7 minutes  Respiration:   Sleep Associated Hypoxemia: sustained hypoxemia was present. Baseline oxygen saturation was 92.1 %.  Time with saturation less than or equal to 88% was 77.0 minutes. The lowest oxygen saturation was 71 %.   Snoring: Snoring was present.  Respiratory events: The home study revealed a presence of 73 obstructive apneas and 7 mixed and central apneas. There were 221 hypopneas resulting in a combined apnea/hypopnea index [AHI] of 41.6 events per hour.  AHI was 55.5 per hour supine, N/A per hour prone, N/A per hour on left side, and 19.6 per hour on right side.   Pattern: Excluding events noted above, respiratory rate and pattern was Normal.     Position: Percent of time spent: supine -61.4 %, prone -0 %, on left -0 %, on right -38.5 %.     Heart Rate: By pulse oximetry, the average rate was normal at 74 bpm.  The maximum rate was 95 and the minimum rate was 59 bpm.     Assessment:     Severe obstructive sleep apnea,  pronounced during supine sleep.    Sleep associated hypoxemia was present.      Jeffrey Skaggs was set up at [place] on January 29, 2020. Patient received a Resmed AirSense 10 Auto. Pressures were set at 5-15 cm H2O. Patient was " last followed up at the sleep clinic via telephone visit in April 2020, during that visit the pressure settings on the auto CPAP were revised to range 8-15 cmH2O and subsequently lost to follow-up.  He reports that in November 2020 he had the COVID-19 infection and  since then he stopped using the device due to concerns about respiratory infection.    He wanted to retry using the CPAP recently and found the device was molded.  He is interested in CPAP treatment and wants to obtain new CPAP device to treat the sleep apnea.  He reported positive benefits with CPAP treatment when he was previously using it.   Without the CPAP, he reports snoring and observed apneas during sleep.  He denies awakenings due to gasping for air or choking.  He reports dry mouth, but denies morning headaches.  He is an occasional mouth breather.      He is not currently working.  His bedtime is around 12 midnight. It takes approximately 10 minutes for him to fall asleep.  He usually wakes up twice during the night to use the bathroom and is able to fall back asleep right away. He wakes up spontaneously between 11 AM to noon. He reports nonrestorative sleep, fatigue and excessive daytime sleepiness. He has been driving mostly short distances. He reports  drowsiness while driving, but denies any close calls or accidents due to drowsiness while driving.    He reports infrequent  symptoms of restlessness in the legs, that usually happens in the middle of the night but not when he first goes to bed.     He reports sleep talking. But there are no reports of sleepwalking, sleep eating or dream enactment behavior.    SCALES:    EPWORTH SLEEPINESS SCALE      [place] Sleepiness Scale ( MADIE Mac  [id number]<br>ESS - [place]/English - Final version - 21 Nov 07 - [facility] [place].) 1/16/2023   Sitting and reading High chance of dozing   Watching TV Moderate chance of dozing   Sitting, inactive in a public place (e.g. a theatre or a meeting) Moderate  "chance of dozing   As a passenger in a car for an hour without a break Moderate chance of dozing   Lying down to rest in the afternoon when circumstances permit High chance of dozing   Sitting and talking to someone Slight chance of dozing   Sitting quietly after a lunch without alcohol Moderate chance of dozing   In a car, while stopped for a few minutes in traffic Moderate chance of dozing   [place] Score (MC) 17   [place] Score (Sleep) 17         INSOMNIA SEVERITY INDEX (EVER)      Insomnia Severity Index (EVER) 1/16/2023   Difficulty falling asleep 1   Difficulty staying asleep 2   Problems waking up too early 1   How SATISFIED/DISSATISFIED are you with your CURRENT sleep pattern? 3   How NOTICEABLE to others do you think your sleep problem is in terms of impairing the quality of your life? 3   How WORRIED/DISTRESSED are you about your current sleep problem? 3   To what extent do you consider your sleep problem to INTERFERE with your daily functioning (e.g. daytime fatigue, mood, ability to function at work/daily chores, concentration, memory, mood, etc.) CURRENTLY? 3   EVER Total Score 16       Guidelines for Scoring/Interpretation:  Total score categories:  0-7 = No clinically significant insomnia   8-14 = Subthreshold insomnia   15-21 = Clinical insomnia (moderate severity)  22-28 = Clinical insomnia (severe)  Used via courtesy of www.Set.fmth.va.gov with permission from Matthew Sierra PhD., [facility]         STOP BANG Questionnaire (  2008, the American Society of Anesthesiologists, Inc. [employer] 1/18/2023   Neck Cir (cm) Clinic: -   B/P Clinic: -   BMI Clinic: 32.61         GAD7    No flowsheet data found.      CAGE-AID    No flowsheet data found.    CAGE-AID reprinted with permission from the [place] Medical Journal, BETSY Ramsay. and GRACIELA Haney, \"Conjoint screening questionnaires for alcohol and drug abuse\" [place] Medical Journal 94: 135-140, 1995.      PATIENT HEALTH " QUESTIONNAIRE-9 (PHQ - 9)    No flowsheet data found.    Developed by Spencer Baeza, Mago Liu, Sarthak Townsend and colleagues, with an educational nay from [employer] No permission required to reproduce, translate, display or distribute.        Allergies:    Allergies   Allergen Reactions     Seasonal Allergies        Medications:    Current Outpatient Medications   Medication Sig Dispense Refill     alcohol swab prep pads Use to swab area of injection/jama, one time daily, for BS. 100 each 6     aspirin 81 MG tablet Take 1 tablet (81 mg) by mouth daily 30 tablet 11     blood glucose (ACCU-CHEK GUIDE) test strip Use to test blood sugar One times daily or as directed. 100 strip 3     blood glucose (ACCU-CHEK SOFTCLIX) lancing device Device to be used with lancets. 1 each 0     blood glucose (IGLUCOSE TEST STRIPS) test strip Use to test blood sugar 1 times daily, give brand per patient 100 strip 3     blood glucose monitoring (ACCU-CHEK FASTCLIX) lancets 1 each daily 100 each 3     blood glucose monitoring (ACCU-CHEK MULTICLIX) lancets Use to test blood sugar One times daily. 100 each 6     Blood Glucose Monitoring Suppl (ACCU-CHEK GUIDE ME) w/Device KIT 1 Device daily 1 kit 0     fenofibrate (TRICOR) 145 MG tablet Take 1 tablet (145 mg) by mouth daily 90 tablet 3     glipiZIDE (GLUCOTROL XL) 10 MG 24 hr tablet Take 1 tablet (10 mg) by mouth daily 90 tablet 0     metoprolol succinate ER (TOPROL XL) 100 MG 24 hr tablet Take 1 tablet (100 mg) by mouth daily 90 tablet 3     neomycin-polymyxin-hydrocortisone (CORTISPORIN) 3.5-44788-0 otic solution Place 3 drops into the right ear 4 times daily 10 mL 0     triamcinolone (KENALOG) 0.1 % external cream Apply topically 2 times daily 45 g 3       Problem List:  Patient Active Problem List    Diagnosis Date Noted     CKD (chronic kidney disease) stage 2, GFR 60-89 ml/min 12/31/2022     Priority: Medium     Abnormal finding on thyroid function test  12/31/2022     Priority: Medium     Hypertriglyceridemia 02/05/2020     Priority: Medium     Hyperlipidemia LDL goal <100 10/09/2015     Priority: Medium     Cirrhosis of liver (H) 04/11/2013     Priority: Medium     Type 2 diabetes mellitus with stage 2 chronic kidney disease (H) 10/31/2010     Priority: Medium     Benign essential hypertension 06/21/2004     Priority: Medium        Past Medical/Surgical History:  Past Medical History:   Diagnosis Date     BENIGN HYPERTENSION 6/21/2004     CARDIOVASCULAR SCREENING; LDL GOAL LESS THAN 100 10/31/2010     CKD (chronic kidney disease) stage 3, GFR 30-59 ml/min (H) 2/5/2013     Liver function tests abnormal 5/12/2008     Type 2 diabetes, HbA1C goal < 8% (H) 10/31/2010     No past surgical history on file.    Social History:  Social History     Socioeconomic History     Marital status:      Spouse name: Not on file     Number of children: Not on file     Years of education: Not on file     Highest education level: Not on file   Occupational History     Not on file   Tobacco Use     Smoking status: Never     Smokeless tobacco: Never   Vaping Use     Vaping Use: Never used   Substance and Sexual Activity     Alcohol use: Not Currently     Comment: stopped 2-3 weeks ago     Drug use: No     Sexual activity: Yes   Other Topics Concern     Parent/sibling w/ CABG, MI or angioplasty before 65F 55M? Yes     Comment: father had bypass mid 70's   Social History Narrative     Not on file     Social Determinants of Health     Financial Resource Strain: Not on file   Food Insecurity: Not on file   Transportation Needs: Not on file   Physical Activity: Not on file   Stress: Not on file   Social Connections: Not on file   Intimate Partner Violence: Not on file   Housing Stability: Not on file       Family History:  Family History   Problem Relation Age of Onset     C.A.D. Father         first MI at age 73     Diabetes Mother      C.A.SONNY. Paternal Grandfather         first MI in  "his 30s, from which he        Review of Systems:  A complete review of systems reviewed by me is negative with the exeption of what has been mentioned in the history of present illness.     Physical Examination:  Vitals: Ht 1.626 m (5' 4\")   Wt 86.2 kg (190 lb)   BMI 32.61 kg/m    BMI= Body mass index is 32.61 kg/m .    General: No apparent distress, appropriately groomed  Head: Normocephalic, atraumatic  Chest: No cough, no audible wheezing, able to talk in full sentences  Psych: coherent speech, normal rate and volume, able to articulate logical thoughts, able   to abstract reason, no tangential thoughts, no hallucinations   or delusions  His  affect is normal  Neuro:  Mental status: Alert and  Oriented X 3  Speech: normal            Data: All pertinent previous laboratory data reviewed     Recent Labs   Lab Test 22  1407 22  1536   * 129*   POTASSIUM 4.7 5.1   CHLORIDE 98 100   CO2 22 24   ANIONGAP 15 5   * 146*   BUN 14.3 24   CR 0.94 1.14   STEFAN 10.0 10.1       Recent Labs   Lab Test 22  1536   WBC 8.8   RBC 4.36*   HGB 14.2   HCT 41.3   MCV 95   MCH 32.6   MCHC 34.4   RDW 13.0          Recent Labs   Lab Test 22  1407   PROTTOTAL 7.6   ALBUMIN 4.2   BILITOTAL 1.0   ALKPHOS 221*   AST 59*   ALT 26       TSH   Date Value   2022 7.66 uIU/mL (H)   2022 8.30 mU/L (H)   2022 6.64 mU/L (H)   2014 3.78 mU/L   2013 3.84 mU/L       No results found for: UAMP, UBARB, BENZODIAZEUR, UCANN, UCOC, OPIT, UPCP    No results found for: IRONSAT, [id number], WU    No results found for: PH, PHARTERIAL, PO2, QI0HXOYBPSH, SAT, PCO2, HCO3, BASEEXCESS, BOBBY, BEB    Echocardiology: No results found for this or any previous visit (from the past 4320 hour(s)).    Chest x-ray: No results found for this or any previous visit from the past 365 days.      Chest CT: No results found for this or any previous visit from the past 365 days.      PFT: Most Recent [place] " Pulmonary Function Testing    No results found for: 20001       Rachelle Zapata MD 1/18/2023

## 2023-01-19 ENCOUNTER — TELEPHONE (OUTPATIENT)
Dept: SLEEP MEDICINE | Facility: CLINIC | Age: 63
End: 2023-01-19
Payer: COMMERCIAL

## 2023-01-19 NOTE — TELEPHONE ENCOUNTER
Thanks for the update.  That is what I told the patient, but he mentions that his insurance would cover so I will generate the prescription which she could use to purchase through other resources.

## 2023-01-19 NOTE — TELEPHONE ENCOUNTER
Called patient LVM letting patient know that since he got a machine in 2020 insurance will not cover a replacement device because it hasn't been 5 years. Also let patient know that warranty for [employer] is 2 years so his machine will not be covered and recommended him to look at [email or web address] for a replacement. Left our phone number [facility] [phone] in case he has further questions

## 2023-01-19 NOTE — PATIENT INSTRUCTIONS
"Prescription for auto CPAP has been generated and forwarded to [facility].  Their phone number is [phone].  After obtaining the CPAP equipment, we recommend you to use the device regularly and please get back to us if you have any concerns.  Please call the sleep clinic at [phone] after you obtain the CPAP equipment to schedule a follow-up via video visit in approximately 6 to 8 weeks to review compliance measures.  Frequently asked questions:  1. What is Obstructive Sleep Apnea (SANTY)? SANTY is the most common type of sleep apnea. Apnea means, \"without breath.\"  Apnea is most often caused by narrowing or collapse of the upper airway as muscles relax during sleep.   Almost everyone has occasional apneas. Most people with sleep apnea have had brief interruptions at night frequently for many years.  The severity of sleep apnea is related to how frequent and severe the events are.   2. What are the consequences of SANTY? Symptoms include: feeling sleepy during the day, snoring loudly, gasping or stopping of breathing, trouble sleeping, and occasionally morning headaches or heartburn at night.  Sleepiness can be serious and even increase the risk of falling asleep while driving. Other health consequences may include development of high blood pressure and other cardiovascular disease in persons who are susceptible. Untreated SANTY  can contribute to heart disease, stroke and diabetes.   3. What are the treatment options? In most situations, sleep apnea is a lifelong disease that must be managed with daily therapy. Medications are not effective for sleep apnea and surgery is generally not considered until other therapies have been tried. Your treatment is your choice . Continuous Positive Airway (CPAP) works right away and is the therapy that is effective in nearly everyone. An oral device to hold your jaw forward is usually the next most reliable option. Other options include " postioning devices (to keep you off your back), weight loss, and surgery including a tongue pacing device. There is more detail about some of these options below.  4. Are my sleep studies covered by insurance? Although we will request verification of coverage, we advise you also check in advance of the study to ensure there is coverage.    Important tips for those choosing CPAP and similar devices   Know your equipment:  CPAP is continuous positive airway pressure that prevents obstructive sleep apnea by keeping the throat from collapsing while you are sleeping. In most cases, the device is  smart  and can slowly self-adjusts if your throat collapses and keeps a record every day of how well you are treated-this information is available to you and your care team.  BPAP is bilevel positive airway pressure that keeps your throat open and also assists each breath with a pressure boost to maintain adequate breathing.  Special kinds of BPAP are used in patients who have inadequate breathing from lung or heart disease. In most cases, the device is  smart  and can slowly self-adjusts to assist breathing. Like CPAP, the device keeps a record of how well you are treated.  Your mask is your connection to the device. You get to choose what feels most comfortable and the staff will help to make sure if fits. Here: are some examples of the different masks that are available:       Key points to remember on your journey with sleep apnea:  Sleep study.  PAP devices often need to be adjusted during a sleep study to show that they are effective and adjusted right.  Good tips to remember: Try wearing just the mask during a quiet time during the day so your body adapts to wearing it. A humidifier is recommended for comfort in most cases to prevent drying of your nose and throat. Allergy medication from your provider may help you if you are having nasal congestion.  Getting settled-in. It takes more than one night for most of us to get  used to wearing a mask. Try wearing just the mask during a quiet time during the day so your body adapts to wearing it. A humidifier is recommended for comfort in most cases. Our team will work with you carefully on the first day and will be in contact within 4 days and again at 2 and 4 weeks for advice and remote device adjustments. Your therapy is evaluated by the device each day.   Use it every night. The more you are able to sleep naturally for 7-8 hours, the more likely you will have good sleep and to prevent health risks or symptoms from sleep apnea. Even if you use it 4 hours it helps. Occasionally all of us are unable to use a medical therapy, in sleep apnea, it is not dangerous to miss one night.   Communicate. Call our skilled team on the number provided on the first day if your visit for problems that make it difficult to wear the device. Over 2 out of 3 patients can learn to wear the device long-term with help from our team. Remember to call our team or your sleep providers if you are unable to wear the device as we may have other solutions for those who cannot adapt to mask CPAP therapy. It is recommended that you sleep your sleep provider within the first 3 months and yearly after that if you are not having problems.   Use it for your health. We encourage use of CPAP masks during daytime quiet periods to allow your face and brain to adapt to the sensation of CPAP so that it will be a more natural sensation to awaken to at night or during naps. This can be very useful during the first few weeks or months of adapting to CPAP though it does not help medically to wear CPAP during wakefulness and  should not be used as a strategy just to meet guidelines.  Take care of your equipment. Make sure you clean your mask and tubing using directions every day and that your filter and mask are replaced as recommended or if they are not working.     BESIDES CPAP, WHAT OTHER THERAPIES ARE THERE?    Positioning  Device  Positioning devices are generally used when sleep apnea is mild and only occurs on your back.This example shows a pillow that straps around the waist. It may be appropriate for those whose sleep study shows milder sleep apnea that occurs primarily when lying flat on one's back. Preliminary studies have shown benefit but effectiveness at home may need to be verified by a home sleep test. These devices are generally not covered by medical insurance.  Examples of devices that maintain sleeping on the back to prevent snoring and mild sleep apnea.    Belt type body positioner  http://[employer]Followap/    Electronic reminder  http://nightshifttherapy.com/            Oral Appliance  What is oral appliance therapy?  An oral appliance device fits on your teeth at night like a retainer used after having braces. The device is made by a specialized dentist and requires several visits over 1-2 months before a manufactured device is made to fit your teeth and is adjusted to prevent your sleep apnea. Once an oral device is working properly, snoring should be improved. A home sleep test may be recommended at that time if to determine whether the sleep apnea is adequately treated.       Some things to remember:  -Oral devices are often, but not always, covered by your medical insurance. Be sure to check with your insurance provider.   -If you are referred for oral therapy, you will be given a list of specialized dentists to consider or you may choose to visit the Web site of the American Academy of Dental Sleep Medicine  -Oral devices are less likely to work if you have severe sleep apnea or are extremely overweight.     More detailed information  An oral appliance is a small acrylic device that fits over the upper and lower teeth  (similar to a retainer or a mouth guard). This device slightly moves jaw forward, which moves the base of the tongue forward, opens the airway, improves breathing for effective treat snoring and  obstructive sleep apnea in perhaps 7 out of 10 people .  The best working devices are custom-made by a dental device  after a mold is made of the teeth 1, 2, 3.  When is an oral appliance indicated?  Oral appliance therapy is recommended as a first-line treatment for patients with primary snoring, mild sleep apnea, and for patients with moderate sleep apnea who prefer appliance therapy to use of CPAP4, 5. Severity of sleep apnea is determined by sleep testing and is based on the number of respiratory events per hour of sleep.   How successful is oral appliance therapy?  The success rate of oral appliance therapy in patients with mild sleep apnea is 75-80% while in patients with moderate sleep apnea it is 50-70%. The chance of success in patients with severe sleep apnea is 40-50%. The research also shows that oral appliances have a beneficial effect on the cardiovascular health of SANTY patients at the same magnitude as CPAP therapy7.  Oral appliances should be a second-line treatment in cases of severe sleep apnea, but if not completely successful then a combination therapy utilizing CPAP plus oral appliance therapy may be effective. Oral appliances tend to be effective in a broad range of patients although studies show that the patients who have the highest success are females, younger patients, those with milder disease, and less severe obesity. 3, 6.   Finding a dentist that practices dental sleep medicine  Specific training is available through the American Academy of Dental Sleep Medicine for dentists interested in working in the field of sleep. To find a dentist who is educated in the field of sleep and the use of oral appliances, near you, visit the Web site of the American Academy of Dental Sleep Medicine.    References  1. Daksha et al. Objectively measured vs self-reported compliance during oral appliance therapy for sleep-disordered breathing. Chest 2013; 144(5): 4856-7140.  2. Hua  et al. Objective measurement of compliance during oral appliance therapy for sleep-disordered breathing. Thorax 2013; 68(1): 91-96.  3. Georgia et al. Mandibular advancement devices in 620 men and women with SANTY and snoring: tolerability and predictors of treatment success. Chest 2004; 125: 1821-4641.  4. Compa et al. Oral appliances for snoring and SANTY: a review. Sleep 2006; 29: 244-262.  5. Sujata et al. Oral appliance treatment for SANTY: an update. J Clin Sleep Med 2014; 10(2): 215-227.  6. Ruiz et al. Predictors of OSAH treatment outcome. J Dent Res 2007; 86: 8432-3350.      Weight Loss:    Weight loss is a long-term strategy that may improve sleep apnea in some patients.    Weight management is a personal decision and the decision should be based on your interest and the potential benefits.  If you are interested in exploring weight loss strategies, the following discussion covers the impact on weight loss on sleep apnea and the approaches that may be successful.    Being overweight does not necessarily mean you will have health consequences.  Those who have BMI over 35 or over 27 with existing medical conditions carries greater risk.   Weight loss decreases severity of sleep apnea in most people with obesity. For those with mild obesity who have developed snoring with weight gain, even 15-30 pound weight loss can improve and occasionally eliminate sleep apnea.  Structured and life-long dietary and health habits are necessary to lose weight and keep healthier weight levels.     Though there may be significant health benefits from weight loss, long-term weight loss is very difficult to achieve- studies show success with dietary management in less than 10% of people. In addition, substantial weight loss may require years of dietary control and may be difficult if patients have severe obesity. In these cases, surgical management may be considered.  Finally, older individuals who have tolerated  obesity without health complications may be less likely to benefit from weight loss strategies.      Surgery:    Surgery for obstructive sleep apnea is considered generally only when other therapies fail to work. Surgery may be discussed with you if you are having a difficult time tolerating CPAP and or when there is an abnormal structure that requires surgical correction.  Nose and throat surgeries often enlarge the airway to prevent collapse.  Most of these surgeries create pain for 1-2 weeks and up to half of the most common surgeries are not effective throughout life.  You should carefully discuss the benefits and drawbacks to surgery with your sleep provider and surgeon to determine if it is the best solution for you.   More information  Surgery for SANTY is directed at areas that are responsible for narrowing or complete obstruction of the airway during sleep.  There are a wide range of procedures available to enlarge and/or stabilize the airway to prevent blockage of breathing in the three major areas where it can occur: the palate, tongue, and nasal regions.  Successful surgical treatment depends on the accurate identification of the factors responsible for obstructive sleep apnea in each person.  A personalized approach is required because there is no single treatment that works well for everyone.  Because of anatomic variation, consultation with an examination by a sleep surgeon is a critical first step in determining what surgical options are best for each patient.  In some cases, examination during sedation may be recommended in order to guide the selection of procedures.  Patients will be counseled about risks and benefits as well as the typical recovery course after surgery. Surgery is typically not a cure for a person s SANTY.  However, surgery will often significantly improve one s SANTY severity (termed  success rate ).  Even in the absence of a cure, surgery will decrease the cardiovascular risk  associated with OSA7; improve overall quality of life8 (sleepiness, functionality, sleep quality, etc).      Palate Procedures:  Patients with SANTY often have narrowing of their airway in the region of their tonsils and uvula.  The goals of palate procedures are to widen the airway in this region as well as to help the tissues resist collapse.  Modern palate procedure techniques focus on tissue conservation and soft tissue rearrangement, rather than tissue removal.  Often the uvula is preserved in this procedure. Residual sleep apnea is common in patient after pharyngoplasty with an average reduction in sleep apnea events of 33%2.      Tongue Procedures:  ExamWhile patients are awake, the muscles that surround the throat are active and keep this region open for breathing. These muscles relax during sleep, allowing the tongue and other structures to collapse and block breathing.  There are several different tongue procedures available.  Selection of a tongue base procedure depends on characteristics seen on physical exam.  Generally, procedures are aimed at removing bulky tissues in this area or preventing the back of the tongue from falling back during sleep.  Success rates for tongue surgery range from 50-62%3.    Hypoglossal Nerve Stimulation:  Hypoglossal nerve stimulation has recently received approval from the [place] Food and Drug Administration for the treatment of obstructive sleep apnea.  This is based on research showing that the system was safe and effective in treating sleep apnea6.  Results showed that the median AHI score decreased 68%, from 29.3 to 9.0. This therapy uses an implant system that senses breathing patterns and delivers mild stimulation to airway muscles, which keeps the airway open during sleep.  The system consists of three fully implanted components: a small generator (similar in size to a pacemaker), a breathing sensor, and a stimulation lead.  Using a small handheld remote, a  patient turns the therapy on before bed and off upon awakening.    Candidates for this device must be greater than 18 years of age, have moderate to severe SANTY (AHI between 15-65), BMI less than 35, have tried CPAP/oral appliance for at least 8 weeks without success, and have appropriate upper airway anatomy (determined by a sleep endoscopy performed by Dr. Frederick Uribe).    Hypoglossal Nerve Stimulation Pathway:    The sleep surgeon s office will work with the patient through the insurance prior-authorization process (including communications and appeals).    Nasal Procedures:  Nasal obstruction can interfere with nasal breathing during the day and night.  Studies have shown that relief of nasal obstruction can improve the ability of some patients to tolerate positive airway pressure therapy for obstructive sleep apnea1.  Treatment options include medications such as nasal saline, topical corticosteroid and antihistamine sprays, and oral medications such as antihistamines or decongestants. Non-surgical treatments can include external nasal dilators for selected patients. If these are not successful by themselves, surgery can improve the nasal airway either alone or in combination with these other options.      Combination Procedures:  Combination of surgical procedures and other treatments may be recommended, particularly if patients have more than one area of narrowing or persistent positional disease.  The success rate of combination surgery ranges from 66-80%2,3.    References  Refugio WAGONER. The Role of the Nose in Snoring and Obstructive Sleep Apnoea: An Update.  Eur Arch Otorhinolaryngol. 2011; 268: 1365-73.   Lambert SM; Liss JA; Bettye JR; Pallanch JF; Curtis MB; Tonya SG; Marilee GLEASON. Surgical modifications of the upper airway for obstructive sleep apnea in adults: a systematic review and meta-analysis. SLEEP 2010;33(10):5512-6684. Enrique MCMANUS. Hypopharyngeal surgery in obstructive sleep apnea: an  evidence-based medicine review.  Arch Otolaryngol Head Neck Surg. 2006 Feb;132(2):206-13.  Darwin YH1, Angela Y, Conner EUGENE. The efficacy of anatomically based multilevel surgery for obstructive sleep apnea. Otolaryngol Head Neck Surg. 2003 Oct;129(4):327-35.  Enrique MCMANUS, Goldberg A. Hypopharyngeal Surgery in Obstructive Sleep Apnea: An Evidence-Based Medicine Review. Arch Otolaryngol Head Neck Surg. 2006 Feb;132(2):206-13.  Agnes SWEENEY et al. Upper-Airway Stimulation for Obstructive Sleep Apnea.  N Engl J Med. 2014 Jan 9;370(2):139-49.  Kaci Y et al. Increased Incidence of Cardiovascular Disease in Middle-aged Men with Obstructive Sleep Apnea. Am J Respir Crit Care Med; 2002 166: 159-165  Hackettjeff RAZA et al. Studying Life Effects and Effectiveness of Palatopharyngoplasty (SLEEP) study: Subjective Outcomes of Isolated Uvulopalatopharyngoplasty. Otolaryngol Head Neck Surg. 2011; 144: 623-631.    Remember to Drive Safe... Drive Alive     Sleep health profoundly affects your health, mood, and your safety.  Thirty three percent of the population (one in three of us) is not getting enough sleep and many have a sleep disorder. Not getting enough sleep or having an untreated / undertreated sleep condition may make us sleepy without even knowing it. In fact, our driving could be dramatically impaired due to our sleep health. As your provider, here are some things I would like you to know about driving:     Here are some warning signs for impairment and dangerous drowsy driving:              -Having been awake more than 16 hours               -Looking tired               -Eyelid drooping              -Head nodding (it could be too late at this point)              -Driving for more than 30 minutes     Some things you could do to make the driving safer if you are experiencing some drowsiness:              -Stop driving and rest              -Call for transportation              -Make sure your sleep disorder is adequately treated      Some things that have been shown NOT to work when experiencing drowsiness while driving:              -Turning on the radio              -Opening windows              -Eating any  distracting  /  entertaining  foods (e.g., sunflower seeds, candy, or any other)              -Talking on the phone      Your decision may not only impact your life, but also the life of others. Please, remember to drive safe for yourself and all of us.

## 2023-01-23 DIAGNOSIS — E11.22 TYPE 2 DIABETES MELLITUS WITH STAGE 2 CHRONIC KIDNEY DISEASE, WITHOUT LONG-TERM CURRENT USE OF INSULIN (H): ICD-10-CM

## 2023-01-23 DIAGNOSIS — N18.2 TYPE 2 DIABETES MELLITUS WITH STAGE 2 CHRONIC KIDNEY DISEASE, WITHOUT LONG-TERM CURRENT USE OF INSULIN (H): ICD-10-CM

## 2023-01-25 RX ORDER — GLIPIZIDE 10 MG/1
10 TABLET, FILM COATED, EXTENDED RELEASE ORAL DAILY
Qty: 90 TABLET | Refills: 0 | Status: SHIPPED | OUTPATIENT
Start: 2023-01-25 | End: 2023-01-27

## 2023-01-25 NOTE — TELEPHONE ENCOUNTER
Pending Prescriptions:                       Disp   Refills    glipiZIDE (GLUCOTROL XL) 10 MG 24 hr tabl*90 tab*0            Sig: Take 1 tablet (10 mg) by mouth daily    Prescription approved per [facility] Refill Protocol.

## 2023-01-26 ASSESSMENT — ENCOUNTER SYMPTOMS: WEAKNESS: 1

## 2023-01-27 ENCOUNTER — OFFICE VISIT (OUTPATIENT)
Dept: INTERNAL MEDICINE | Facility: CLINIC | Age: 63
End: 2023-01-27
Payer: COMMERCIAL

## 2023-01-27 VITALS
HEART RATE: 79 BPM | HEIGHT: 64 IN | OXYGEN SATURATION: 92 % | SYSTOLIC BLOOD PRESSURE: 165 MMHG | RESPIRATION RATE: 16 BRPM | DIASTOLIC BLOOD PRESSURE: 70 MMHG | WEIGHT: 199.8 LBS | TEMPERATURE: 98.5 F | BODY MASS INDEX: 34.11 KG/M2

## 2023-01-27 DIAGNOSIS — Z12.11 SCREEN FOR COLON CANCER: ICD-10-CM

## 2023-01-27 DIAGNOSIS — N18.2 TYPE 2 DIABETES MELLITUS WITH STAGE 2 CHRONIC KIDNEY DISEASE, WITHOUT LONG-TERM CURRENT USE OF INSULIN (H): ICD-10-CM

## 2023-01-27 DIAGNOSIS — I10 BENIGN ESSENTIAL HYPERTENSION: ICD-10-CM

## 2023-01-27 DIAGNOSIS — E11.22 TYPE 2 DIABETES MELLITUS WITH STAGE 2 CHRONIC KIDNEY DISEASE, WITHOUT LONG-TERM CURRENT USE OF INSULIN (H): ICD-10-CM

## 2023-01-27 DIAGNOSIS — R01.1 HEART MURMUR: ICD-10-CM

## 2023-01-27 DIAGNOSIS — N18.2 CKD (CHRONIC KIDNEY DISEASE) STAGE 2, GFR 60-89 ML/MIN: ICD-10-CM

## 2023-01-27 DIAGNOSIS — Z00.00 ROUTINE GENERAL MEDICAL EXAMINATION AT A HEALTH CARE FACILITY: Primary | ICD-10-CM

## 2023-01-27 DIAGNOSIS — E78.1 HYPERTRIGLYCERIDEMIA: ICD-10-CM

## 2023-01-27 DIAGNOSIS — K76.9 PARENCHYMAL LIVER DISEASE: ICD-10-CM

## 2023-01-27 DIAGNOSIS — K74.60 CIRRHOSIS OF LIVER WITHOUT ASCITES, UNSPECIFIED HEPATIC CIRRHOSIS TYPE (H): ICD-10-CM

## 2023-01-27 PROCEDURE — 99214 OFFICE O/P EST MOD 30 MIN: CPT | Mod: 25

## 2023-01-27 PROCEDURE — 99396 PREV VISIT EST AGE 40-64: CPT

## 2023-01-27 RX ORDER — AMLODIPINE BESYLATE 5 MG/1
5 TABLET ORAL DAILY
Qty: 90 TABLET | Refills: 0 | Status: SHIPPED | OUTPATIENT
Start: 2023-01-27 | End: 2023-04-06

## 2023-01-27 RX ORDER — GLIPIZIDE 10 MG/1
20 TABLET, FILM COATED, EXTENDED RELEASE ORAL DAILY
Qty: 180 TABLET | Refills: 0 | Status: SHIPPED | OUTPATIENT
Start: 2023-01-27 | End: 2023-04-06

## 2023-01-27 ASSESSMENT — PAIN SCALES - GENERAL: PAINLEVEL: NO PAIN (0)

## 2023-01-27 ASSESSMENT — ENCOUNTER SYMPTOMS: WEAKNESS: 1

## 2023-01-27 NOTE — PROGRESS NOTES
SUBJECTIVE:   CC: Luc is an 62 year old who presents for preventative health visit.     Patient has been advised of split billing requirements and indicates understanding: Yes     Healthy Habits:     Getting at least 3 servings of Calcium per day:  Yes    Bi-annual eye exam:  Yes    Dental care twice a year:  Yes    Sleep apnea or symptoms of sleep apnea:  Daytime drowsiness, Excessive snoring and Sleep apnea    Diet:  Low salt, Diabetic and Carbohydrate counting    Frequency of exercise:  1 day/week    Duration of exercise:  30-45 minutes    Taking medications regularly:  Yes    Medication side effects:  None    PHQ-2 Total Score: 1    Additional concerns today:  No        Diabetes Follow-up    How often are you checking your blood sugar? One time daily  What time of day are you checking your blood sugars (select all that apply)?  Before meals  Have you had any blood sugars above 200?  Yes always   Have you had any blood sugars below 70?  No    What symptoms do you notice when your blood sugar is low?  None    What concerns do you have today about your diabetes? None and Blood sugar is often over 200     Do you have any of these symptoms? (Select all that apply)  Blurry vision    Hyperlipidemia Follow-Up      Are you regularly taking any medication or supplement to lower your cholesterol?   Yes- Tricor     Are you having muscle aches or other side effects that you think could be caused by your cholesterol lowering medication?  No    Hypertension Follow-up      Do you check your blood pressure regularly outside of the clinic? Yes     Are you following a low salt diet? Yes    Are your blood pressures ever more than 140 on the top number (systolic) OR more   than 90 on the bottom number (diastolic), for example 140/90? Yes    BP Readings from Last 2 Encounters:   01/27/23 (!) 146/72   05/12/22 118/70     Hemoglobin A1C (%)   Date Value   12/14/2022 9.4 (H)   05/06/2022 6.2 (H)   08/26/2020 7.1 (H)   12/13/2019 8.8  (H)     LDL Cholesterol Calculated (mg/dL)   Date Value   12/14/2022 93   02/23/2022 81   08/26/2020 88   12/13/2019     Cannot estimate LDL when triglyceride exceeds 400 mg/dL     LDL Cholesterol Direct (mg/dL)   Date Value   12/13/2019 90       Today's PHQ-2 Score:   PHQ-2 ( 1999 [employer]) 1/26/2023   Q1: Little interest or pleasure in doing things 0   Q2: Feeling down, depressed or hopeless 1   PHQ-2 Score 1   PHQ-2 Total Score (12-17 Years)- Positive if 3 or more points; Administer PHQ-A if positive -   Q1: Little interest or pleasure in doing things Not at all   Q2: Feeling down, depressed or hopeless Several days   PHQ-2 Score 1       Social History     Tobacco Use     Smoking status: Never     Smokeless tobacco: Never   Substance Use Topics     Alcohol use: Not Currently     Comment: stopped 2-3 weeks ago     If you drink alcohol do you typically have >3 drinks per day or >7 drinks per week? No    Alcohol Use 1/26/2023   Prescreen: >3 drinks/day or >7 drinks/week? No       Last PSA:   PSA   Date Value Ref Range Status   04/22/2014 0.26 0 - 4 ug/L Final       Reviewed orders with patient. Reviewed health maintenance and updated orders accordingly - Yes  Lab work is in process    Reviewed and updated as needed this visit by clinical staff   Tobacco  Allergies  Meds              Reviewed and updated as needed this visit by Provider                 Past Medical History:   Diagnosis Date     BENIGN HYPERTENSION 6/21/2004     CARDIOVASCULAR SCREENING; LDL GOAL LESS THAN 100 10/31/2010     CKD (chronic kidney disease) stage 3, GFR 30-59 ml/min (H) 2/5/2013     Liver function tests abnormal 5/12/2008     Type 2 diabetes, HbA1C goal < 8% (H) 10/31/2010      No past surgical history on file.    Review of Systems   Genitourinary: Negative for impotence and penile discharge.   Skin: Positive for rash.   Neurological: Positive for weakness.     Stopped using CPAP in September 2020. Packed up and when he opened it was  "moldy.    Cirrhosis- fatty liver- saw GI in hospital at one point.     Has been taking glipizide once a day. Dr. Eric recommended that he start doubling medication he has not done this yet as he was low on medication.      Patient has been checking blood sugar at home and it has always been over 200s. It is around     He has been trying to eat a low carb diet.     BP at home- has been high around 160/78 or so.     Previously taken lisinopril- was talking off for a reason that is unknown to him     OBJECTIVE:   BP (!) 146/72   Pulse 79   Temp (!) 95.8  F (35.4  C) (Tympanic)   Resp 16   Ht 1.626 m (5' 4\")   Wt 90.6 kg (199 lb 12.8 oz)   SpO2 92%   BMI 34.30 kg/m      Physical Exam  GENERAL: alert and no distress  EYES: Eyes grossly normal to inspection, PERRL and conjunctivae and sclerae normal  HENT: ear canals and TM's normal, nose and mouth without ulcers or lesions  NECK: no adenopathy, no asymmetry, masses, or scars and thyroid normal to palpation  RESP: lungs clear to auscultation - no rales, rhonchi or wheezes  CV: regular rate and rhythm, normal S1 S2, no S3 or S4, murmur noted, click or rub, no peripheral edema and peripheral pulses strong  ABDOMEN: soft, nontender, hepatomegaly present, nosplenomegaly, no masses and bowel sounds normal  MS: no gross musculoskeletal defects noted, no edema  SKIN: no suspicious lesions or rashes  NEURO: Normal strength and tone, mentation intact and speech normal  PSYCH: mentation appears normal, affect normal/bright    Diagnostic Test Results:  Labs reviewed in Epic      ASSESSMENT/PLAN:   (Z00.00) Routine general medical examination at a health care facility  (primary encounter diagnosis)  Comment: Patient had recent lab work done with PCP about 6 weeks ago.      (I10) Benign essential hypertension  Comment: Patient blood pressure was elevated in clinic, and per report is elevated at home as well.  We will start amlodipine 5 mg.  Hesitant to start ACE/ARB on patient " because in 2013 when he was hospitalized with LIZETT and that and it was thought to be at least partially be related to lisinopril use.  Patient should return to clinic in about 4 weeks for blood pressure check and diabetes check  Plan: amLODIPine (NORVASC) 5 MG tablet            (R01.1) Heart murmur  Comment: Noted on exam, will get echocardiogram to evaluate  Plan: Echocardiogram Complete            (K74.60) Cirrhosis of liver without ascites, unspecified hepatic cirrhosis type (H)  Comment: Recent liver function test slightly elevated.  On exam patient right upper quadrant firm to palpation- we will get ultrasound of liver for comparison to one completed in 2014    Referal to GI placed.   Plan: US Abdomen Limited            (E11.22,  N18.2) Type 2 diabetes mellitus with stage 2 chronic kidney disease, without long-term current use of insulin (H)  Comment: Most recent A1c was 9.4.  Plan from PCP was to increase glipizide to 20 mg/day.  Patient has not done this yet because he was low on pills, I have ordered more medication so he is able to do this.  He states that blood sugars at home are always over 200.  Patient educated on signs and symptoms of hypoglycemia, and treatment for hypoglycemia if he were to happen.  Plan: glipiZIDE (GLUCOTROL XL) 10 MG 24 hr tablet            (N18.2) CKD (chronic kidney disease) stage 2, GFR 60-89 ml/min  Comment: Patient most recent kidney function is within normal limits.  Protein in urine is but I am hesitant to restart ACE/ARB due to acute kidney injury that was in part thought to be related to lisinopril use.      (E78.1) Hypertriglyceridemia  Comment: Currently on Tricor.  Last lipid panel as LDL within normal ranges, triglycerides are still elevated at 382 but much improved from highest value of 1419.    Patient was hospitalized with cirrhosis in 2013, and on statin previouse to that.    (Z12.11) Screen for colon cancer  Comment: Patient states that he will reach out to  "provider at [facility] for colonoscopy.  He prefers to have this done near his home in [facility]      COUNSELING:   Reviewed preventive health counseling, as reflected in patient instructions      BMI:   Estimated body mass index is 34.3 kg/m  as calculated from the following:    Height as of this encounter: 1.626 m (5' 4\").    Weight as of this encounter: 90.6 kg (199 lb 12.8 oz).       He reports that he has never smoked. He has never used smokeless tobacco.      Omar Jones NP  [facility]  "

## 2023-01-27 NOTE — PATIENT INSTRUCTIONS
Start taking glipizide twice a day breakfast    Start exercising at home- 30 minutes walking on treadmill (or as much as you tolerate)     Come back to check BP in about 4-6 weeks     If blood sugar is less than 80 notify clinic, drink some juice and recheck to make sure it comes back normal.     Reach out to provider near [facility]

## 2023-02-09 ENCOUNTER — HOSPITAL ENCOUNTER (OUTPATIENT)
Dept: ULTRASOUND IMAGING | Facility: CLINIC | Age: 63
Discharge: HOME | End: 2023-02-09
Payer: COMMERCIAL

## 2023-02-09 DIAGNOSIS — K74.60 CIRRHOSIS OF LIVER WITHOUT ASCITES, UNSPECIFIED HEPATIC CIRRHOSIS TYPE (H): ICD-10-CM

## 2023-02-09 PROCEDURE — 76705 ECHO EXAM OF ABDOMEN: CPT

## 2023-02-10 NOTE — TELEPHONE ENCOUNTER
DIAGNOSIS: Parenchymal liver disease    Appt Date: 04.03.2023   NOTES STATUS DETAILS   OFFICE NOTE from referring provider Internal 01.27.2023 Omar Jones NP   OFFICE NOTES from other specialists     DISCHARGE SUMMARY from hospital     MEDICATION LIST Internal    LIVER BIOSPY (IF APPLICABLE)      PATHOLOGY REPORTS      IMAGING     ENDOSCOPY (IF AVAILABLE)     COLONOSCOPY (IF AVAILABLE)     ULTRASOUND LIVER Internal 02.09.2023 US ABDOMEN LIMITED    CT OF ABDOMEN     MRI OF LIVER     FIBROSCAN, US ELASTOGRAPHY, FIBROSIS SCAN, MR ELASTOGRAPHY     LABS     HEPATIC PANEL (LIVER PANEL)     BASIC METABOLIC PANEL Internal 08.26.2020   COMPLETE METABOLIC PANEL Internal 04.01.2022   COMPLETE BLOOD COUNT (CBC) Internal 04.01.2022   INTERNATIONAL NORMALIZED RATIO (INR)     HEPATITIS C ANTIBODY     HEPATITIS C VIRAL LOAD/PCR     HEPATITIS C GENOTYPE     HEPATITIS B SURFACE ANTIGEN     HEPATITIS B SURFACE ANTIBODY     HEPATITIS B DNA QUANT LEVEL     HEPATITIS B CORE ANTIBODY

## 2023-02-15 ENCOUNTER — HOSPITAL ENCOUNTER (OUTPATIENT)
Dept: CARDIOLOGY | Facility: CLINIC | Age: 63
Discharge: HOME | End: 2023-02-15
Payer: COMMERCIAL

## 2023-02-15 DIAGNOSIS — R01.1 HEART MURMUR: ICD-10-CM

## 2023-02-15 LAB — LVEF ECHO: NORMAL

## 2023-02-15 PROCEDURE — 93306 TTE W/DOPPLER COMPLETE: CPT

## 2023-02-15 PROCEDURE — 93306 TTE W/DOPPLER COMPLETE: CPT | Mod: 26 | Performed by: INTERNAL MEDICINE

## 2023-04-03 ENCOUNTER — PRE VISIT (OUTPATIENT)
Dept: GASTROENTEROLOGY | Facility: CLINIC | Age: 63
End: 2023-04-03
Payer: COMMERCIAL

## 2023-04-05 DIAGNOSIS — I10 BENIGN ESSENTIAL HYPERTENSION: ICD-10-CM

## 2023-04-05 DIAGNOSIS — N18.2 TYPE 2 DIABETES MELLITUS WITH STAGE 2 CHRONIC KIDNEY DISEASE, WITHOUT LONG-TERM CURRENT USE OF INSULIN (H): ICD-10-CM

## 2023-04-05 DIAGNOSIS — E11.22 TYPE 2 DIABETES MELLITUS WITH STAGE 2 CHRONIC KIDNEY DISEASE, WITHOUT LONG-TERM CURRENT USE OF INSULIN (H): ICD-10-CM

## 2023-04-06 RX ORDER — GLIPIZIDE 10 MG/1
20 TABLET, FILM COATED, EXTENDED RELEASE ORAL DAILY
Qty: 180 TABLET | Refills: 0 | Status: SHIPPED | OUTPATIENT
Start: 2023-04-06 | End: 2023-08-29

## 2023-04-06 RX ORDER — AMLODIPINE BESYLATE 5 MG/1
5 TABLET ORAL DAILY
Qty: 90 TABLET | Refills: 0 | Status: SHIPPED | OUTPATIENT
Start: 2023-04-06 | End: 2023-09-15

## 2023-04-06 NOTE — TELEPHONE ENCOUNTER
Routing refill request to provider for review/approval because:  BP Readings from Last 3 Encounters:   01/27/23 (!) 165/70   05/12/22 118/70   05/06/22 138/72     Hemoglobin A1C   Date Value Ref Range Status   12/14/2022 9.4 (H) 0.0 - 5.6 % Final     Comment:     Normal <5.7%   Prediabetes 5.7-6.4%    Diabetes 6.5% or higher     Note: Adopted from ADA consensus guidelines.   08/26/2020 7.1 (H) 0 - 5.6 % Final     Comment:     Normal <5.7% Prediabetes 5.7-6.4%  Diabetes 6.5% or higher - adopted from ADA   consensus guidelines.

## 2023-05-15 ENCOUNTER — TELEPHONE (OUTPATIENT)
Dept: INTERNAL MEDICINE | Facility: CLINIC | Age: 63
End: 2023-05-15
Payer: COMMERCIAL

## 2023-05-15 NOTE — TELEPHONE ENCOUNTER
Patient requesting referral for sleep study as he holds his breath in his sleep.   They want a referral to [facility] [place] [facility]  Phone for [facility] [phone] (Jennifer)  Patient does not have the fax number  Please advise. Ok to call and  [phone]

## 2023-05-15 NOTE — TELEPHONE ENCOUNTER
Pt lives in [place]. Will route to Referrals to see if can be referred to [facility] there.

## 2023-05-16 ENCOUNTER — TELEPHONE (OUTPATIENT)
Dept: INTERNAL MEDICINE | Facility: CLINIC | Age: 63
End: 2023-05-16
Payer: COMMERCIAL

## 2023-05-16 NOTE — TELEPHONE ENCOUNTER
Wife calls and states that [facility] needs some things from us but she could not tell me what.  Per Spouse we need to call Jennifer at [facility].  Her telephone number is [phone].    Left a voicemail for Jennifer to call clinic.

## 2023-05-16 NOTE — TELEPHONE ENCOUNTER
If [facility] is in his insurance network he can go to [place]. Patient has a PPO plan and does not require referrals. He would just need a order for the sleep study. Once placed in [facility] I will fax.     Thank you,  Monica MAIER-referral coordinator

## 2023-05-17 NOTE — TELEPHONE ENCOUNTER
Fax received from [facility] for review and signature.  Put in Dr. Eric's in basket.

## 2023-05-19 ENCOUNTER — MEDICAL CORRESPONDENCE (OUTPATIENT)
Dept: HEALTH INFORMATION MANAGEMENT | Facility: CLINIC | Age: 63
End: 2023-05-19

## 2023-05-19 NOTE — TELEPHONE ENCOUNTER
Wife calls very upset that the referral has not be signed. Wife says that all of the appointment are being taken because they have no referral.    Wife is requesting another Provider sign the referral and call her once it has been done.  Put in Dr. Galvez's in basket.

## 2023-06-12 NOTE — TELEPHONE ENCOUNTER
Despite referral needs a prior authorization for sleep study that is scheduled for this Saturday.     [facility]     The patient's wife is requesting  provider to do peer to peer before trying prior auth team due to appointment is on Saturday.     Peer to peer review is needed to expedite.   [phone] Shireen      Advised will send to  as a high priority she will be in tomorrow.     Wife is requesting notification  [phone]  Azalea HULL RN   [facility] Triage

## 2023-06-13 ENCOUNTER — TELEPHONE (OUTPATIENT)
Dept: INTERNAL MEDICINE | Facility: CLINIC | Age: 63
End: 2023-06-13
Payer: COMMERCIAL

## 2023-06-13 NOTE — TELEPHONE ENCOUNTER
Not clear why it was denied, please find out the reason.  If it was denied because it is out of network I would not be able to get it approved and he will have to go to our sleep center.

## 2023-06-13 NOTE — TELEPHONE ENCOUNTER
Patient's wife is calling because [facility] has a sleep study scheduled for this Saturday June 17th and the insurance needs Dr Eric to call them and do a peer to peer at [phone].

## 2023-06-13 NOTE — TELEPHONE ENCOUNTER
"Call to [facility] benefit management.   [facility] out of state    Member ID: [id number]      Transferred 3 times.   Ph #[phone]. To say that pt has \"House acct\" with Jac[facility].     LESLI, [id number]  [employer] Tax ID [id number]    [phone] Peer to Peer. It is highly recommended now that they have the information.     After answering the questions, he Does not meet the criteria right now, for doing the study in the center. They are going to pass this on for review. The peer to peer will help.   He may have to do home sleep study.   Case #[id number]            "

## 2023-06-13 NOTE — TELEPHONE ENCOUNTER
He should find out why they need a prior authorization.  Since he is actually seeing our sleep clinic here and they are making the recommendation for sleep study any prior authorization should be done by them not me.

## 2023-06-13 NOTE — TELEPHONE ENCOUNTER
"Pt wife calls back and had Jennifer from sleep study center on the line. Gurjit ([place] insurance) requires a PA for all sleep studies.  Sleep study center cannot initiate PA because [facility] is an \"outside agency\" and they don't have a contract with us. So, PA must be initiated by [facility]. Once PA is initiated should be able to do the peer to peer as a way to exopodite PA. Please call wife Betzaida with questions.     Nieves Singletary RN [facility] Triage    "

## 2023-06-13 NOTE — TELEPHONE ENCOUNTER
Then please call and initiate the prior authorization.  They told me on the phone that I do not have to call them.

## 2023-06-13 NOTE — TELEPHONE ENCOUNTER
Call to Sh[facility]en. They just said pt needs a Prior Authorization. Could be because of the of the CPT code.   Or because of the specific test being done.   She states the the pt would need to call his insurance for more details why PA is needed.

## 2023-06-13 NOTE — TELEPHONE ENCOUNTER
Wife and pt call back.   Discussed with them about PA is still in process, but probably going to be denied, since he did not meet the criteria, except the BMI,  and will need to do Home sleep study.     Advised that they may want to schedule sleep consult with doctor at [facility] to discuss next step.     They agree with this plan.

## 2023-06-13 NOTE — TELEPHONE ENCOUNTER
I called this number.  They said that the request had not yet been sent in from the facility and so they have none of the records so they could not do a peer to peer review yet.  They need to call the sleep clinic and have them start the request for the study.

## 2023-06-13 NOTE — TELEPHONE ENCOUNTER
His insurance is saying he needs a newer sleep study done for a new Cpap machine.     His sleep study was done in 2020 but they are telling him that it isn't recent enough.     He lives in [place] now, so the  Sleep center is too far for him.     The  sleep doctor was not helpful at all and they already discussed with them and they could not help him.

## 2023-06-13 NOTE — TELEPHONE ENCOUNTER
Please see the other message that is already open about this.  I need to know why it was denied.  They probably want him to do within the network and I will probably have to refer him to our sleep center.

## 2023-06-15 NOTE — TELEPHONE ENCOUNTER
Called spouse and relayed message from provider.  She has her CPAP machine and is going to have him try and use her machine.  Patient is going to bring his machine to the medical supply store.

## 2023-06-15 NOTE — TELEPHONE ENCOUNTER
I went back and reviewed the information from our sleep clinic.  They did not recommend repeating his sleep study as they felt his previous home study would allow them to restart his CPAP and monitor him to see if there were adjustments needed.    Since he does not meet the criteria from his insurance and our sleep center did not recommend that he do another sleep study, I am not going to try to call and do the peer to peer review since we know it will be denied based on the information we obtained from the insurance company on the initial call to receive authorization.    He can purchase the CPAP and start on the parameters were given to him from our sleep center in January.  It is unlikely going to another sleep clinic would change the recommendations at all, but if he chooses to do that, that would be fine.

## 2023-08-20 ENCOUNTER — HEALTH MAINTENANCE LETTER (OUTPATIENT)
Age: 63
End: 2023-08-20

## 2023-08-29 DIAGNOSIS — I10 BENIGN ESSENTIAL HYPERTENSION: ICD-10-CM

## 2023-08-29 DIAGNOSIS — E78.1 HYPERTRIGLYCERIDEMIA: ICD-10-CM

## 2023-08-29 DIAGNOSIS — N18.2 TYPE 2 DIABETES MELLITUS WITH STAGE 2 CHRONIC KIDNEY DISEASE (H): ICD-10-CM

## 2023-08-29 DIAGNOSIS — N18.2 TYPE 2 DIABETES MELLITUS WITH STAGE 2 CHRONIC KIDNEY DISEASE, WITHOUT LONG-TERM CURRENT USE OF INSULIN (H): ICD-10-CM

## 2023-08-29 DIAGNOSIS — E11.22 TYPE 2 DIABETES MELLITUS WITH STAGE 2 CHRONIC KIDNEY DISEASE, WITHOUT LONG-TERM CURRENT USE OF INSULIN (H): ICD-10-CM

## 2023-08-29 DIAGNOSIS — E11.22 TYPE 2 DIABETES MELLITUS WITH STAGE 2 CHRONIC KIDNEY DISEASE (H): ICD-10-CM

## 2023-08-30 DIAGNOSIS — E11.22 TYPE 2 DIABETES MELLITUS WITH STAGE 2 CHRONIC KIDNEY DISEASE (H): ICD-10-CM

## 2023-08-30 DIAGNOSIS — N18.2 TYPE 2 DIABETES MELLITUS WITH STAGE 2 CHRONIC KIDNEY DISEASE (H): ICD-10-CM

## 2023-08-30 RX ORDER — BLOOD SUGAR DIAGNOSTIC
STRIP MISCELLANEOUS
Qty: 100 STRIP | Refills: 1 | Status: SHIPPED | OUTPATIENT
Start: 2023-08-30 | End: 2024-01-30

## 2023-08-31 RX ORDER — BLOOD-GLUCOSE METER
EACH MISCELLANEOUS
Qty: 1 KIT | Refills: 0 | Status: SHIPPED | OUTPATIENT
Start: 2023-08-31 | End: 2023-10-16

## 2023-09-01 RX ORDER — METOPROLOL SUCCINATE 100 MG/1
100 TABLET, EXTENDED RELEASE ORAL DAILY
Qty: 90 TABLET | Refills: 3 | OUTPATIENT
Start: 2023-09-01

## 2023-09-01 RX ORDER — FENOFIBRATE 145 MG/1
145 TABLET, COATED ORAL DAILY
Qty: 90 TABLET | Refills: 3 | OUTPATIENT
Start: 2023-09-01

## 2023-09-01 NOTE — TELEPHONE ENCOUNTER
Fenofibrate and metoprolol- too early    Routing refill request to provider for review/approval because:  Labs not current:  A1C  Patient needs to be seen because it has been more than 1 year since last office visit.    Lisbeth Vargas RN

## 2023-09-09 ENCOUNTER — DOCUMENTATION ONLY (OUTPATIENT)
Dept: SLEEP MEDICINE | Facility: CLINIC | Age: 63
End: 2023-09-09
Payer: COMMERCIAL

## 2023-09-09 NOTE — PROGRESS NOTES
9/9/2023- - [facility] TO CALL [phone] DURING HOURS OF OPERATION.

## 2023-09-12 NOTE — TELEPHONE ENCOUNTER
Please get the patient scheduled for a diabetes appointment, then I will send the refill as below.

## 2023-09-13 RX ORDER — GLIPIZIDE 10 MG/1
20 TABLET, FILM COATED, EXTENDED RELEASE ORAL DAILY
Qty: 60 TABLET | Refills: 0 | Status: SHIPPED | OUTPATIENT
Start: 2023-09-13 | End: 2023-09-15

## 2023-09-15 ENCOUNTER — OFFICE VISIT (OUTPATIENT)
Dept: INTERNAL MEDICINE | Facility: CLINIC | Age: 63
End: 2023-09-15
Payer: COMMERCIAL

## 2023-09-15 VITALS
SYSTOLIC BLOOD PRESSURE: 144 MMHG | RESPIRATION RATE: 18 BRPM | BODY MASS INDEX: 34.15 KG/M2 | TEMPERATURE: 98 F | WEIGHT: 200 LBS | HEIGHT: 64 IN | OXYGEN SATURATION: 94 % | DIASTOLIC BLOOD PRESSURE: 72 MMHG | HEART RATE: 74 BPM

## 2023-09-15 DIAGNOSIS — Z12.11 SCREEN FOR COLON CANCER: Primary | ICD-10-CM

## 2023-09-15 DIAGNOSIS — I10 BENIGN ESSENTIAL HYPERTENSION: ICD-10-CM

## 2023-09-15 DIAGNOSIS — E78.1 HYPERTRIGLYCERIDEMIA: ICD-10-CM

## 2023-09-15 DIAGNOSIS — N18.2 TYPE 2 DIABETES MELLITUS WITH STAGE 2 CHRONIC KIDNEY DISEASE, WITHOUT LONG-TERM CURRENT USE OF INSULIN (H): ICD-10-CM

## 2023-09-15 DIAGNOSIS — E11.22 TYPE 2 DIABETES MELLITUS WITH STAGE 2 CHRONIC KIDNEY DISEASE, WITHOUT LONG-TERM CURRENT USE OF INSULIN (H): ICD-10-CM

## 2023-09-15 LAB
ANION GAP SERPL CALCULATED.3IONS-SCNC: 13 MMOL/L (ref 7–15)
BUN SERPL-MCNC: 17.1 MG/DL (ref 8–23)
CALCIUM SERPL-MCNC: 10 MG/DL (ref 8.8–10.2)
CHLORIDE SERPL-SCNC: 99 MMOL/L (ref 98–107)
CREAT SERPL-MCNC: 0.91 MG/DL (ref 0.67–1.17)
DEPRECATED HCO3 PLAS-SCNC: 22 MMOL/L (ref 22–29)
EGFRCR SERPLBLD CKD-EPI 2021: >90 ML/MIN/1.73M2
GLUCOSE SERPL-MCNC: 173 MG/DL (ref 70–99)
HBA1C MFR BLD: 6.2 % (ref 0–5.6)
POTASSIUM SERPL-SCNC: 4.6 MMOL/L (ref 3.4–5.3)
SODIUM SERPL-SCNC: 134 MMOL/L (ref 136–145)

## 2023-09-15 PROCEDURE — 99214 OFFICE O/P EST MOD 30 MIN: CPT

## 2023-09-15 PROCEDURE — 83036 HEMOGLOBIN GLYCOSYLATED A1C: CPT

## 2023-09-15 PROCEDURE — 80048 BASIC METABOLIC PNL TOTAL CA: CPT

## 2023-09-15 PROCEDURE — 36415 COLL VENOUS BLD VENIPUNCTURE: CPT

## 2023-09-15 RX ORDER — GLIPIZIDE 10 MG/1
20 TABLET, FILM COATED, EXTENDED RELEASE ORAL DAILY
Qty: 60 TABLET | Refills: 0 | Status: SHIPPED | OUTPATIENT
Start: 2023-09-15 | End: 2023-10-16

## 2023-09-15 RX ORDER — AMLODIPINE BESYLATE 5 MG/1
5 TABLET ORAL DAILY
Qty: 90 TABLET | Refills: 0 | Status: SHIPPED | OUTPATIENT
Start: 2023-09-15 | End: 2023-11-07

## 2023-09-15 RX ORDER — FENOFIBRATE 145 MG/1
145 TABLET, COATED ORAL DAILY
Qty: 90 TABLET | Refills: 3 | Status: SHIPPED | OUTPATIENT
Start: 2023-09-15

## 2023-09-15 RX ORDER — METOPROLOL SUCCINATE 100 MG/1
100 TABLET, EXTENDED RELEASE ORAL DAILY
Qty: 90 TABLET | Refills: 3 | Status: SHIPPED | OUTPATIENT
Start: 2023-09-15

## 2023-09-15 NOTE — PROGRESS NOTES
"  Assessment & Plan     (Z12.11) Screen for colon cancer  (primary encounter diagnosis)  Comment: Patient presents to the clinic and has never had a colonoscopy.  Patient agrees to go forward and have his first colon screening.  Plan: Colonoscopy Screening  Referral        Referral sent in.  Patient to schedule screening.    (I10) Benign essential hypertension  Comment: Patient is tolerating blood pressure medication just fine he ran out of his amlodipine which is why his blood pressure has been 144/72 today.  Refill medications requested  Plan: amLODIPine (NORVASC) 5 MG tablet, metoprolol         succinate ER (TOPROL XL) 100 MG 24 hr tablet  Medication sent in.  Continue on prescribed regimen.    (E11.22,  N18.2) Type 2 diabetes mellitus with stage 2 chronic kidney disease, without long-term current use of insulin (H)  Comment: Patient has diabetes.  Stable.  Requesting refill of medication.  Plan: glipiZIDE (GLUCOTROL XL) 10 MG 24 hr tablet,         blood glucose monitoring (ACCU-CHEK MULTICLIX)         lancets        Refill sent in    (E78.1) Hypertriglyceridemia  Comment: Patient has history of high triglycerides and is requesting a refill of his medication.  Plan: fenofibrate (TRICOR) 145 MG tablet        Refill sent in.  All labs were drawn    6 months ago.  Follow-up with PCP in 3 months.      30 minutes spent by me on the date of the encounter doing chart review, interpretation of tests, patient visit, and documentation        BMI:   Estimated body mass index is 34.33 kg/m  as calculated from the following:    Height as of this encounter: 1.626 m (5' 4\").    Weight as of this encounter: 90.7 kg (200 lb).           YANDY Felix [facility] DENYS Calle is a 63 year old, presenting for the following health issues: Patient presents to the clinic for follow-up on diabetes and a med check to get medication sent in.  He has no concerns he is tolerating his " "medications well.  And has been feeling well.  Follow Up and Diabetes      9/15/2023    11:24 AM   Additional Questions   Roomed by Autumn NAYAK CMA       History of Present Illness       CKD: He uses over the counter pain medication, including 81mg asprin, one time daily.    Diabetes:   He presents for follow up of diabetes.  He is checking home blood glucose one time daily.   He checks blood glucose before meals.  Blood glucose is sometimes over 200 and never under 70. He is aware of hypoglycemia symptoms including none.   He is concerned about blood sugar frequently over 200.   He is having blurry vision.  The patient has had a diabetic eye exam in the last 12 months. Eye exam performed on Jan 2023. [facility].        Hyperlipidemia:  He presents for follow up of hyperlipidemia.   He is taking medication to lower cholesterol. He is not having myalgia or other side effects to statin medications.    Hypertension: He presents for follow up of hypertension.  He does check blood pressure  regularly outside of the clinic. Outpatient blood pressures have not been over 140/90. He follows a low salt diet.     He eats 4 or more servings of fruits and vegetables daily.He consumes 1 sweetened beverage(s) daily.He exercises with enough effort to increase his heart rate 20 to 29 minutes per day.  He exercises with enough effort to increase his heart rate 4 days per week.   He is taking medications regularly.                   Review of Systems   Constitutional, HEENT, cardiovascular, pulmonary, gi and gu systems are negative, except as otherwise noted.      Objective    BP (!) 144/72 (BP Location: Right arm, Patient Position: Sitting, Cuff Size: Adult Large)   Pulse 74   Temp 98  F (36.7  C) (Oral)   Resp 18   Ht 1.626 m (5' 4\")   Wt 90.7 kg (200 lb)   SpO2 94%   BMI 34.33 kg/m    Body mass index is 34.33 kg/m .  Physical Exam   GENERAL: healthy, alert and no distress  NECK: no " adenopathy, no asymmetry, masses, or scars and thyroid normal to palpation  RESP: lungs clear to auscultation - no rales, rhonchi or wheezes  CV: regular rate and rhythm, normal S1 S2, no S3 or S4, no murmur, click or rub, no peripheral edema and peripheral pulses strong  ABDOMEN: soft, nontender, no hepatosplenomegaly, no masses and bowel sounds normal  MS: no gross musculoskeletal defects noted, no edema

## 2023-09-18 ENCOUNTER — DOCUMENTATION ONLY (OUTPATIENT)
Dept: SLEEP MEDICINE | Facility: CLINIC | Age: 63
End: 2023-09-18
Payer: COMMERCIAL

## 2023-09-18 NOTE — PROGRESS NOTES
9/18/2023-JL- LEFT VM STATING THAT IS CPAP PRESSURES ARE SET TO [id number] THAT MATCH IS CURRENT RX PRESSURES.

## 2023-09-18 NOTE — PROGRESS NOTES
9/18/2023- JL- SHAW SCHEDULED IN  ON 10/4/2023.  PT NEEDS PRESSURE CHANGED FROM WIVES CPAP (A10)TO MATCH IS RX CURRENT.  HIS A10 HAS MOLD PER CHRISTINA AND HE REFUSES TO SEND IT IN FOR REPAIRS AND SHE SAYS HE WILL NEVER USE IT AGAIN.  I WENT  OVER SUPPLY REPLACEMENT SCHEDULE AND HOW TO CARE FOR MACHINE. ( GIVE ALL HANDOUTS)   I EXPLAINED THAT [employer] WILL REQUIRE PA FOR SUPPLIES, I TOLD HER IF HE WANTS NEW SUPPLIES HE WILL HAVE TO PAY OOP UNTIL WE GET COMPLIANCE PA.  ADDRESS VERIFIED  NEG COVID   ONCE YOU GET SN AND DN REGISTER MACHINE UNDER HIM IN [facility].

## 2023-10-16 DIAGNOSIS — N18.2 TYPE 2 DIABETES MELLITUS WITH STAGE 2 CHRONIC KIDNEY DISEASE, WITHOUT LONG-TERM CURRENT USE OF INSULIN (H): ICD-10-CM

## 2023-10-16 DIAGNOSIS — E11.22 TYPE 2 DIABETES MELLITUS WITH STAGE 2 CHRONIC KIDNEY DISEASE (H): ICD-10-CM

## 2023-10-16 DIAGNOSIS — N18.2 TYPE 2 DIABETES MELLITUS WITH STAGE 2 CHRONIC KIDNEY DISEASE (H): ICD-10-CM

## 2023-10-16 DIAGNOSIS — E11.22 TYPE 2 DIABETES MELLITUS WITH STAGE 2 CHRONIC KIDNEY DISEASE, WITHOUT LONG-TERM CURRENT USE OF INSULIN (H): ICD-10-CM

## 2023-10-16 RX ORDER — BLOOD-GLUCOSE METER
EACH MISCELLANEOUS
Qty: 1 KIT | Refills: 0 | Status: SHIPPED | OUTPATIENT
Start: 2023-10-16

## 2023-10-17 RX ORDER — GLIPIZIDE 10 MG/1
20 TABLET, FILM COATED, EXTENDED RELEASE ORAL DAILY
Qty: 60 TABLET | Refills: 0 | Status: SHIPPED | OUTPATIENT
Start: 2023-10-17

## 2023-10-24 DIAGNOSIS — N18.2 TYPE 2 DIABETES MELLITUS WITH STAGE 2 CHRONIC KIDNEY DISEASE, WITHOUT LONG-TERM CURRENT USE OF INSULIN (H): ICD-10-CM

## 2023-10-24 DIAGNOSIS — E11.22 TYPE 2 DIABETES MELLITUS WITH STAGE 2 CHRONIC KIDNEY DISEASE, WITHOUT LONG-TERM CURRENT USE OF INSULIN (H): ICD-10-CM

## 2023-11-01 ENCOUNTER — DOCUMENTATION ONLY (OUTPATIENT)
Dept: SLEEP MEDICINE | Facility: CLINIC | Age: 63
End: 2023-11-01
Payer: COMMERCIAL

## 2023-11-01 NOTE — PROGRESS NOTES
11/1/23- OTIS- EXPLAINED THAT I AM ABLE TO SEE DATA IN [employer].  HE STATED  IS GOING TO REGISTER HIS  DEVICE SO HE CAN TRACK HIS SLEEP HEALTH.

## 2023-11-07 DIAGNOSIS — I10 BENIGN ESSENTIAL HYPERTENSION: ICD-10-CM

## 2023-11-07 RX ORDER — AMLODIPINE BESYLATE 5 MG/1
5 TABLET ORAL DAILY
Qty: 30 TABLET | Refills: 0 | Status: SHIPPED | OUTPATIENT
Start: 2023-11-07 | End: 2023-12-18

## 2023-11-07 RX ORDER — AMLODIPINE BESYLATE 5 MG/1
5 TABLET ORAL DAILY
Qty: 90 TABLET | Refills: 1 | Status: SHIPPED | OUTPATIENT
Start: 2023-11-07 | End: 2023-11-07

## 2023-11-07 NOTE — TELEPHONE ENCOUNTER
Pt calls for refill of his Amlodipine to Mail order pharmacy and 30 days to local pharmacy, until mail order received.     Prescription approved per [facility] Refill Protocol.    Also advised pt to schedule with new PCP since Dr Eric has retired. He agrees.

## 2023-12-15 DIAGNOSIS — I10 BENIGN ESSENTIAL HYPERTENSION: ICD-10-CM

## 2023-12-18 RX ORDER — AMLODIPINE BESYLATE 5 MG/1
5 TABLET ORAL DAILY
Qty: 90 TABLET | Refills: 1 | Status: SHIPPED | OUTPATIENT
Start: 2023-12-18

## 2024-01-03 ENCOUNTER — TELEPHONE (OUTPATIENT)
Dept: INTERNAL MEDICINE | Facility: CLINIC | Age: 64
End: 2024-01-03
Payer: COMMERCIAL

## 2024-01-03 NOTE — TELEPHONE ENCOUNTER
Patient Quality Outreach    Patient is due for the following:   Diabetes -  A1C, LDL (Fasting), Eye Exam, Microalbumin, BP Check, Diabetic Follow-Up Visit, and Foot Exam  Colon Cancer Screening  Physical Preventive Adult Physical      Topic Date Due    Hepatitis A Vaccine (1 of 2 - Risk 2-dose series) Never done    Zoster (Shingles) Vaccine (1 of 2) Never done    Pneumococcal Vaccine (2 of 2 - PCV) 09/21/2017    Hepatitis B Vaccine (1 of 3 - Risk 3-dose series) Never done    Flu Vaccine (1) 09/01/2023    COVID-19 Vaccine (4 - 2023-24 season) 09/01/2023       Next Steps:   Patient has upcoming appointment, these items will be addressed at that time.    Type of outreach:    none      Questions for provider review:    None           Ivan Rodriguez MA  Chart routed to none.

## 2024-01-04 ENCOUNTER — PATIENT OUTREACH (OUTPATIENT)
Dept: CARE COORDINATION | Facility: CLINIC | Age: 64
End: 2024-01-04
Payer: COMMERCIAL

## 2024-01-18 ENCOUNTER — PATIENT OUTREACH (OUTPATIENT)
Dept: CARE COORDINATION | Facility: CLINIC | Age: 64
End: 2024-01-18
Payer: COMMERCIAL

## 2024-01-27 DIAGNOSIS — N18.2 TYPE 2 DIABETES MELLITUS WITH STAGE 2 CHRONIC KIDNEY DISEASE (H): ICD-10-CM

## 2024-01-27 DIAGNOSIS — E11.22 TYPE 2 DIABETES MELLITUS WITH STAGE 2 CHRONIC KIDNEY DISEASE (H): ICD-10-CM

## 2024-01-30 RX ORDER — BLOOD SUGAR DIAGNOSTIC
STRIP MISCELLANEOUS
Qty: 100 STRIP | Refills: 1 | Status: SHIPPED | OUTPATIENT
Start: 2024-01-30 | End: 2024-08-29

## 2024-03-17 ENCOUNTER — HEALTH MAINTENANCE LETTER (OUTPATIENT)
Age: 64
End: 2024-03-17

## 2024-07-27 NOTE — TELEPHONE ENCOUNTER
Physical Therapy                 Therapy Communication Note    Patient Name: Colin Leonard  MRN: [id number]  Today's Date: 7/27/2024     Discipline: Physical Therapy    Missed Visit Reason: Missed Visit Reason: Patient refused, Other (Comment) (attempted to see patient x 2, son visiting, asked therapy to re-attempt at a later time.)    Missed Time: Attempt       Routing refill request to provider for review/approval because:  Labs not current:    Patient needs to be seen because:    No recent blood pressure readings    Pending Prescriptions:                       Disp   Refills    metoprolol succinate ER (TOPROL-XL) 100 MG*90 tab*0        Sig: TAKE ONE TABLET BY MOUTH ONE TIME DAILY    fenofibrate (TRICOR) 145 MG tablet [Pharma*90 tab*0        Sig: TAKE ONE TABLET BY MOUTH ONE TIME DAILY    glipiZIDE (GLUCOTROL XL) 10 MG 24 hr table*90 tab*0        Sig: TAKE ONE TABLET BY MOUTH ONE TIME DAILY

## 2024-07-30 DIAGNOSIS — I10 BENIGN ESSENTIAL HYPERTENSION: ICD-10-CM

## 2024-07-31 RX ORDER — AMLODIPINE BESYLATE 5 MG/1
5 TABLET ORAL DAILY
Qty: 90 TABLET | Refills: 0 | OUTPATIENT
Start: 2024-07-31

## 2024-08-29 DIAGNOSIS — E11.22 TYPE 2 DIABETES MELLITUS WITH STAGE 2 CHRONIC KIDNEY DISEASE (H): ICD-10-CM

## 2024-08-29 DIAGNOSIS — N18.2 TYPE 2 DIABETES MELLITUS WITH STAGE 2 CHRONIC KIDNEY DISEASE (H): ICD-10-CM

## 2024-08-29 RX ORDER — BLOOD SUGAR DIAGNOSTIC
STRIP MISCELLANEOUS
Qty: 100 STRIP | Refills: 2 | Status: SHIPPED | OUTPATIENT
Start: 2024-08-29

## 2024-10-13 ENCOUNTER — HEALTH MAINTENANCE LETTER (OUTPATIENT)
Age: 64
End: 2024-10-13

## 2025-02-22 ENCOUNTER — HEALTH MAINTENANCE LETTER (OUTPATIENT)
Age: 65
End: 2025-02-22

## 2025-05-03 ENCOUNTER — HEALTH MAINTENANCE LETTER (OUTPATIENT)
Age: 65
End: 2025-05-03